# Patient Record
Sex: MALE | Race: WHITE | NOT HISPANIC OR LATINO | Employment: FULL TIME | URBAN - METROPOLITAN AREA
[De-identification: names, ages, dates, MRNs, and addresses within clinical notes are randomized per-mention and may not be internally consistent; named-entity substitution may affect disease eponyms.]

---

## 2017-02-07 ENCOUNTER — ALLSCRIPTS OFFICE VISIT (OUTPATIENT)
Dept: OTHER | Facility: OTHER | Age: 47
End: 2017-02-07

## 2017-06-07 DIAGNOSIS — R74.01 NONSPECIFIC ELEVATION OF LEVELS OF TRANSAMINASE AND LACTIC ACID DEHYDROGENASE (LDH): ICD-10-CM

## 2017-06-07 DIAGNOSIS — R74.02 NONSPECIFIC ELEVATION OF LEVELS OF TRANSAMINASE AND LACTIC ACID DEHYDROGENASE (LDH): ICD-10-CM

## 2017-06-07 DIAGNOSIS — I10 ESSENTIAL (PRIMARY) HYPERTENSION: ICD-10-CM

## 2017-06-07 DIAGNOSIS — Z00.00 ENCOUNTER FOR GENERAL ADULT MEDICAL EXAMINATION WITHOUT ABNORMAL FINDINGS: ICD-10-CM

## 2017-06-10 ENCOUNTER — APPOINTMENT (OUTPATIENT)
Dept: LAB | Facility: MEDICAL CENTER | Age: 47
End: 2017-06-10
Payer: COMMERCIAL

## 2017-06-10 DIAGNOSIS — Z00.00 ENCOUNTER FOR GENERAL ADULT MEDICAL EXAMINATION WITHOUT ABNORMAL FINDINGS: ICD-10-CM

## 2017-06-10 DIAGNOSIS — I10 ESSENTIAL (PRIMARY) HYPERTENSION: ICD-10-CM

## 2017-06-10 LAB
ALBUMIN SERPL BCP-MCNC: 4.2 G/DL (ref 3.5–5)
ALP SERPL-CCNC: 43 U/L (ref 46–116)
ALT SERPL W P-5'-P-CCNC: 89 U/L (ref 12–78)
ANION GAP SERPL CALCULATED.3IONS-SCNC: 7 MMOL/L (ref 4–13)
AST SERPL W P-5'-P-CCNC: 42 U/L (ref 5–45)
BILIRUB SERPL-MCNC: 1.4 MG/DL (ref 0.2–1)
BUN SERPL-MCNC: 13 MG/DL (ref 5–25)
CALCIUM SERPL-MCNC: 9.5 MG/DL (ref 8.3–10.1)
CHLORIDE SERPL-SCNC: 104 MMOL/L (ref 100–108)
CHOLEST SERPL-MCNC: 182 MG/DL (ref 50–200)
CO2 SERPL-SCNC: 29 MMOL/L (ref 21–32)
CREAT SERPL-MCNC: 1.02 MG/DL (ref 0.6–1.3)
EST. AVERAGE GLUCOSE BLD GHB EST-MCNC: 108 MG/DL
GFR SERPL CREATININE-BSD FRML MDRD: >60 ML/MIN/1.73SQ M
GLUCOSE P FAST SERPL-MCNC: 109 MG/DL (ref 65–99)
HBA1C MFR BLD: 5.4 % (ref 4.2–6.3)
HDLC SERPL-MCNC: 47 MG/DL (ref 40–60)
LDLC SERPL CALC-MCNC: 109 MG/DL (ref 0–100)
POTASSIUM SERPL-SCNC: 4.4 MMOL/L (ref 3.5–5.3)
PROT SERPL-MCNC: 7.6 G/DL (ref 6.4–8.2)
SODIUM SERPL-SCNC: 140 MMOL/L (ref 136–145)
TRIGL SERPL-MCNC: 128 MG/DL

## 2017-06-10 PROCEDURE — 83036 HEMOGLOBIN GLYCOSYLATED A1C: CPT

## 2017-06-10 PROCEDURE — 36415 COLL VENOUS BLD VENIPUNCTURE: CPT

## 2017-06-10 PROCEDURE — 80053 COMPREHEN METABOLIC PANEL: CPT

## 2017-06-10 PROCEDURE — 80061 LIPID PANEL: CPT

## 2017-06-11 ENCOUNTER — GENERIC CONVERSION - ENCOUNTER (OUTPATIENT)
Dept: OTHER | Facility: OTHER | Age: 47
End: 2017-06-11

## 2017-08-07 ENCOUNTER — APPOINTMENT (OUTPATIENT)
Dept: LAB | Facility: MEDICAL CENTER | Age: 47
End: 2017-08-07
Payer: COMMERCIAL

## 2017-08-07 DIAGNOSIS — R74.02 NONSPECIFIC ELEVATION OF LEVELS OF TRANSAMINASE OR LACTIC ACID DEHYDROGENASE (LDH): Primary | ICD-10-CM

## 2017-08-07 DIAGNOSIS — R74.01 NONSPECIFIC ELEVATION OF LEVELS OF TRANSAMINASE OR LACTIC ACID DEHYDROGENASE (LDH): Primary | ICD-10-CM

## 2017-08-07 LAB
ALBUMIN SERPL BCP-MCNC: 4 G/DL (ref 3.5–5)
ALP SERPL-CCNC: 46 U/L (ref 46–116)
ALT SERPL W P-5'-P-CCNC: 56 U/L (ref 12–78)
ANION GAP SERPL CALCULATED.3IONS-SCNC: 7 MMOL/L (ref 4–13)
AST SERPL W P-5'-P-CCNC: 24 U/L (ref 5–45)
BILIRUB SERPL-MCNC: 1.09 MG/DL (ref 0.2–1)
BUN SERPL-MCNC: 21 MG/DL (ref 5–25)
CALCIUM SERPL-MCNC: 9.4 MG/DL (ref 8.3–10.1)
CHLORIDE SERPL-SCNC: 106 MMOL/L (ref 100–108)
CO2 SERPL-SCNC: 26 MMOL/L (ref 21–32)
CREAT SERPL-MCNC: 0.94 MG/DL (ref 0.6–1.3)
GFR SERPL CREATININE-BSD FRML MDRD: 96 ML/MIN/1.73SQ M
GLUCOSE P FAST SERPL-MCNC: 109 MG/DL (ref 65–99)
POTASSIUM SERPL-SCNC: 4.6 MMOL/L (ref 3.5–5.3)
PROT SERPL-MCNC: 7.6 G/DL (ref 6.4–8.2)
SODIUM SERPL-SCNC: 139 MMOL/L (ref 136–145)

## 2017-08-07 PROCEDURE — 36415 COLL VENOUS BLD VENIPUNCTURE: CPT

## 2017-08-07 PROCEDURE — 80053 COMPREHEN METABOLIC PANEL: CPT

## 2017-08-08 ENCOUNTER — ALLSCRIPTS OFFICE VISIT (OUTPATIENT)
Dept: OTHER | Facility: OTHER | Age: 47
End: 2017-08-08

## 2017-08-08 DIAGNOSIS — K14.8 OTHER SPECIFIED CONDITIONS OF THE TONGUE: ICD-10-CM

## 2017-08-08 DIAGNOSIS — R73.01 IMPAIRED FASTING GLUCOSE: ICD-10-CM

## 2017-08-19 ENCOUNTER — APPOINTMENT (OUTPATIENT)
Dept: LAB | Facility: MEDICAL CENTER | Age: 47
End: 2017-08-19
Payer: COMMERCIAL

## 2017-08-19 DIAGNOSIS — K14.8 OTHER SPECIFIED CONDITIONS OF THE TONGUE: ICD-10-CM

## 2017-08-19 DIAGNOSIS — R73.01 IMPAIRED FASTING GLUCOSE: ICD-10-CM

## 2017-08-19 LAB
EST. AVERAGE GLUCOSE BLD GHB EST-MCNC: 111 MG/DL
HBA1C MFR BLD: 5.5 % (ref 4.2–6.3)
TSH SERPL DL<=0.05 MIU/L-ACNC: 0.96 UIU/ML (ref 0.36–3.74)

## 2017-08-19 PROCEDURE — 36415 COLL VENOUS BLD VENIPUNCTURE: CPT

## 2017-08-19 PROCEDURE — 84443 ASSAY THYROID STIM HORMONE: CPT

## 2017-08-19 PROCEDURE — 83036 HEMOGLOBIN GLYCOSYLATED A1C: CPT

## 2017-08-20 ENCOUNTER — GENERIC CONVERSION - ENCOUNTER (OUTPATIENT)
Dept: OTHER | Facility: OTHER | Age: 47
End: 2017-08-20

## 2017-09-05 ENCOUNTER — GENERIC CONVERSION - ENCOUNTER (OUTPATIENT)
Dept: OTHER | Facility: OTHER | Age: 47
End: 2017-09-05

## 2018-01-11 NOTE — RESULT NOTES
Message   notify the pt normal tsh, normal HgA1c f/u as scheduled        Verified Results  (1) TSH WITH FT4 REFLEX [id number] 07:22AM [facility]    Order Number: [id number]     Test Name Result Flag Reference   TSH 0 963 uIU/mL  0 358-3 740   Patients undergoing fluorescein dye angiography may retain small amounts of fluorescein in the body for 48-72 hours post procedure  Samples containing fluorescein can produce falsely depressed TSH values  If the patient had this procedure,a specimen should be resubmitted post fluorescein clearance  (1) HEMOGLOBIN A1C [id number] 07:22AM [facility]    Order Number: [id number]     Test Name Result Flag Reference   HEMOGLOBIN A1C 5 5 %  4 2-6 3   EST  AVG   GLUCOSE 111 mg/dl         Signatures   Electronically signed by : Kamila Benítez DO; Aug 20 2017 11:02AM EST                       (Author)

## 2018-01-11 NOTE — RESULT NOTES
Message   Notify the patient there is elevation of the liver enzymes please have the patient discontinue alcohol and discontinue Tylenol and recheck the liver enzymes in 2 weeks also there is elevation of blood sugar in the prediabetic range please reduce carbohydrates and sweets follow up as scheduled        Verified Results  (1) COMPREHENSIVE METABOLIC PANEL [id number] 03:64ZB Cleopatra Dodd   TW Order Number: [id number]     Test Name Result Flag Reference   SODIUM 140 mmol/L  136-145   POTASSIUM 4 4 mmol/L  3 5-5 3   CHLORIDE 104 mmol/L  100-108   CARBON DIOXIDE 29 mmol/L  21-32   ANION GAP (CALC) 7 mmol/L  4-13   BLOOD UREA NITROGEN 13 mg/dL  5-25   CREATININE 1 02 mg/dL  0 60-1 30   Standardized to IDMS reference method   CALCIUM 9 5 mg/dL  8 3-10 1   BILI, TOTAL 1 40 mg/dL H 0 20-1 00   ALK PHOSPHATAS 43 U/L L    ALT (SGPT) 89 U/L H 12-78   AST(SGOT) 42 U/L  5-45   ALBUMIN 4 2 g/dL  3 5-5 0   TOTAL PROTEIN 7 6 g/dL  6 4-8 2   eGFR Non-African American      >60 0 ml/min/1 73sq m   [facility] Disease Education Program recommendations are as follows:  GFR calculation is accurate only with a steady state creatinine  Chronic Kidney disease less than 60 ml/min/1 73 sq  meters  Kidney failure less than 15 ml/min/1 73 sq  meters     GLUCOSE FASTING 109 mg/dL H 65-99       Signatures   Electronically signed by : Kj Juarez DO; Jun 11 2017  8:22AM EST                       (Author)

## 2018-01-12 NOTE — RESULT NOTES
Message   notify the patient normal lipid panel follow up as scheduled        Verified Results  (1) COMPREHENSIVE METABOLIC PANEL [id number] 91:05YH [facility] Order Number: [id number]     Test Name Result Flag Reference   SODIUM 140 mmol/L  136-145   POTASSIUM 4 4 mmol/L  3 5-5 3   CHLORIDE 104 mmol/L  100-108   CARBON DIOXIDE 29 mmol/L  21-32   ANION GAP (CALC) 7 mmol/L  4-13   BLOOD UREA NITROGEN 13 mg/dL  5-25   CREATININE 1 02 mg/dL  0 60-1 30   Standardized to IDMS reference method   CALCIUM 9 5 mg/dL  8 3-10 1   BILI, TOTAL 1 40 mg/dL H 0 20-1 00   ALK PHOSPHATAS 43 U/L L    ALT (SGPT) 89 U/L H 12-78   AST(SGOT) 42 U/L  5-45   ALBUMIN 4 2 g/dL  3 5-5 0   TOTAL PROTEIN 7 6 g/dL  6 4-8 2   eGFR Non-African American      >60 0 ml/min/1 73sq [facility] Disease Education Program recommendations are as follows:  GFR calculation is accurate only with a steady state creatinine  Chronic Kidney disease less than 60 ml/min/1 73 sq  meters  Kidney failure less than 15 ml/min/1 73 sq  meters  GLUCOSE FASTING 109 mg/dL H 65-99     (1) HEMOGLOBIN A1C 10Jun2017 08:42AM [facility] Order Number: [id number]     Test Name Result Flag Reference   HEMOGLOBIN A1C 5 4 %  4 2-6 3   EST  AVG  GLUCOSE 108 mg/dl       (1) LIPID PANEL, FASTING 10Jun2017 08:42AM [facility] Order Number: [id number]     Test Name Result Flag Reference   CHOLESTEROL 182 mg/dL     HDL,DIRECT 47 mg/dL  40-60   Specimen collection should occur prior to Metamizole administration due to the potential for falsely depressed results  LDL CHOLESTEROL CALCULATED 109 mg/dL H 0-100   - Patient Instructions:  This is a fasting blood test  Water,black tea or black  coffee only after 9:00pm the night before test   Drink 2 glasses of water the morning of test       Triglyceride:         Normal              <150 mg/dl       Borderline High    150-199 mg/dl       High 200-499 mg/dl       Very High          >499 mg/dl  Cholesterol:         Desirable        <200 mg/dl      Borderline High  200-239 mg/dl      High             >239 mg/dl  HDL Cholesterol:        High    >59 mg/dL      Low     <41 mg/dL  LDL CALCULATED:    This screening LDL is a calculated result  It does not have the accuracy of the Direct Measured LDL in the monitoring of patients with hyperlipidemia and/or statin therapy  Direct Measure LDL (GDA373) must be ordered separately in these patients  TRIGLYCERIDES 128 mg/dL  <=150   Specimen collection should occur prior to N-Acetylcysteine or Metamizole administration due to the potential for falsely depressed results         Signatures   Electronically signed by : Geoff Diamond DO; Jun 11 2017  8:23AM EST                       (Author)

## 2018-01-14 VITALS
HEIGHT: 69 IN | SYSTOLIC BLOOD PRESSURE: 118 MMHG | DIASTOLIC BLOOD PRESSURE: 88 MMHG | OXYGEN SATURATION: 98 % | WEIGHT: 202.01 LBS | HEART RATE: 85 BPM | BODY MASS INDEX: 29.92 KG/M2 | RESPIRATION RATE: 16 BRPM

## 2018-01-16 NOTE — RESULT NOTES
Message   Normal hemoglobin A1c follow up as scheduled        Verified Results  (1) COMPREHENSIVE METABOLIC PANEL [id number] 71:34ZO [facility] Order Number: [id number]     Test Name Result Flag Reference   SODIUM 140 mmol/L  136-145   POTASSIUM 4 4 mmol/L  3 5-5 3   CHLORIDE 104 mmol/L  100-108   CARBON DIOXIDE 29 mmol/L  21-32   ANION GAP (CALC) 7 mmol/L  4-13   BLOOD UREA NITROGEN 13 mg/dL  5-25   CREATININE 1 02 mg/dL  0 60-1 30   Standardized to IDMS reference method   CALCIUM 9 5 mg/dL  8 3-10 1   BILI, TOTAL 1 40 mg/dL H 0 20-1 00   ALK PHOSPHATAS 43 U/L L    ALT (SGPT) 89 U/L H 12-78   AST(SGOT) 42 U/L  5-45   ALBUMIN 4 2 g/dL  3 5-5 0   TOTAL PROTEIN 7 6 g/dL  6 4-8 2   eGFR Non-African American      >60 0 ml/min/1 73sq [facility] Disease Education Program recommendations are as follows:  GFR calculation is accurate only with a steady state creatinine  Chronic Kidney disease less than 60 ml/min/1 73 sq  meters  Kidney failure less than 15 ml/min/1 73 sq  meters  GLUCOSE FASTING 109 mg/dL H 65-99     (1) HEMOGLOBIN A1C 10Jun2017 08:42AM [facility] Order Number: [id number]     Test Name Result Flag Reference   HEMOGLOBIN A1C 5 4 %  4 2-6 3   EST  AVG   GLUCOSE 108 mg/dl         Signatures   Electronically signed by : Magnus Seip, DO; Jun 11 2017  8:23AM EST                       (Author)

## 2018-01-17 NOTE — PROGRESS NOTES
Assessment    1  Encounter for preventive health examination (V70 0) (Z00 00)   2  Screening for eye condition (V80 2) (Z13 5)    Assessment and plan #1 annual wellness examination completed per the patient overall the patient is clinically stable and doing well I'll be ordering the patient's comprehensive metabolic panel, hemoglobin A1c, lipid panel, I recommended that he see his eye doctor for routine examination, see dentist every 6 months for cleaning, wear a sunscreen, I would like him to discontinue alcohol he declines counseling, I did advise the patient lose weight following a healthy imbalance diet and routine exercise including walking RTO in 6 months call with any problems  Plan  Health Maintenance    · (1) COMPREHENSIVE METABOLIC PANEL; Status:Active; Requested [id number];    · (1) HEMOGLOBIN A1C; Status:Active; Requested [id number];    · Begin a limited exercise program ; Status:Complete;   Done: 30OSX7477 09:41AM   · Limit your use of alcohol to 2 drinks or cans of beer a day ; Status:Complete;   Done:  33GNA4180 09:41AM   · Some eating tips that can help you lose weight ; Status:Complete;   Done: 40DHD8595  09:41AM  Health Maintenance, Hypertension    · (1) LIPID PANEL, FASTING; Status:Active; Requested [id number];   Screening for eye condition    · Fawn Mendez MD, [facility]  (Ophthalmology) Physician Referral  Consult Only: the  expectation is that the referring provider will communicate back to the patient on  treatment options  Evaluation and Treatment: the expectation is that the referred to  provider will communicate back to the patient on treatment options  Status: Hold For  - Scheduling  Requested for: 23DUO5092  Care Summary provided  : Yes    Discussion/Summary  Impression: health maintenance visit  Prostate cancer screening: PSA is not indicated  Testicular cancer screening: monthly self testicular exam was advised   Colorectal cancer screening: colorectal cancer screening is not indicated  Screening lab work includes glucose and lipid profile  The risks and benefits of immunizations were discussed and immunizations are up to date  He was advised to be evaluated by an ophthalmologist and a dentist  Advice and education were given regarding nutrition, aerobic exercise, weight loss, alcohol use and sunscreen use  Patient discussion: discussed with the patient  History of Present Illness  HM, Adult Male: The patient is being seen for a health maintenance evaluation  Social History: Household members include spouse and 2 son(s)  He is   Work status: working full time and occupation:   The patient is a former cigarette smoker and quit smoking   He 10 pack yrs  He reports frequent alcohol use, drinking 5-6 drinks per day and 3 yrs  He has never used illicit drugs  He marijuana as kid no iv  General Health: The patient's health since the last visit is described as good  He does not have regular dental visits  The patient brushes 2 time(s) a day, flosses occasionally and reports his last dental visit was 2 years ago  He denies vision problems  Vision care includes an eye examination within the last year  He denies hearing loss  Hearing is normal    Lifestyle:  He consumes a diverse and healthy diet  Dietary details include 2 servings of fruit per day, 2 servings of vegetables per day, 1 servings of meat per day, 1 servings of whole grains per day, 80 ounces of water per day, 2 servings of dairy foods per day and 2 cups of coffee per day  He has weight concerns  He does not exercise regularly  He does not use tobacco  The patient is a former cigarette smoker  Tobacco Use Duration: 1 cigarette pack(s) per day, 365 pack year(s) of cigarette use and 10 year(s) of cigarette use  He consumes alcohol  He reports frequent alcohol use and drinking 5-6 drinks per day  He typically drinks beer, but no wine consumption and no hard liquor consumption  He denies drug use   He has never used illicit drugs  Reproductive health:  the patient is sexually active  He denies erectile dysfunction  Screening: Prostate cancer screening includes no previous evaluation  Testicular cancer screening includes no self testicular examinations  Colorectal cancer screening includes no previous screening  Safety elements used: seat belt, safe driving habits, smoke detector, carbon monoxide detector, gun trigger locks and CPR training for household members, but no CPR training for the patient  Risk findings: no passive smoke exposure, no anxiety symptoms, no depression symptoms, no travel to developing areas and no tuberculosis exposure  Active Problems    1  Abdominal pain (789 00) (R10 9)   2  Alpha-1-antitrypsin deficiency carrier (V83 89) (E88 01)   3  Anxiety (300 00) (F41 9)   4  Cervical radiculopathy (723 4) (M54 12)   5  Fatty liver (571 8) (K76 0)   6  Flu vaccine need (V04 81) (Z23)   7  Generalized anxiety disorder (300 02) (F41 1)   8  Hypertension (401 9) (I10)   9  Neck pain (723 1) (M54 2)   10  Need for influenza vaccination (V04 81) (Z23)   11  Nontoxic single thyroid nodule (241 0) (E04 1)   12  Paresthesia (782 0) (R20 2)   13  Skin rash (782 1) (R21)   14  Viral gastroenteritis (008 8) (A08 4)   15  Visit for pre-operative examination (V72 84) (Z01 818)    Past Medical History    · History of essential hypertension (V12 59) (Z86 79)    Surgical History    · History of Cataract Surgery   · History of Knee Surgery    Family History  Family History    · Family history of Colon Cancer (V16 0)   · Family history of Diabetes Mellitus (V18 0)   · Family history of Family Health Status Of Father - Alive   · Family history of Family Health Status Of Mother - Alive    Social History    · Alcohol Use (History)   · Being A Social Drinker   · Denied: History of Drug Use   · Former smoker (V90 45) (X63 374)   · Quit 20 years ago   Smoked for 7 years about a half a pack of cigarettes a day    Current Meds   1  AmLODIPine Besylate 5 MG Oral Tablet; take 1 tablet by mouth one time daily; Therapy: 45IKS8993 to (Magi Fajardo)  Requested for: 79LDD5637; Last   Rx:91Pqt9387 Ordered   2  Prednicarbate 0 1 % External Cream; APPLY SPARINGLY TO AFFECTED AREA(S)   TWICE DAILY; Therapy: 21YWZ9979 to Recorded   3  Vitamin C 500 MG Oral Capsule; TAKE 1 CAPSULE DAILY; Therapy: (Recorded:07Feb2017) to Recorded    Allergies    1  No Known Drug Allergies    2  No Known Environmental Allergies   3  No Known Food Allergies    Vitals   Recorded: 32LMF9803 08:43AM   Heart Rate 68   Respiration 16   Systolic 353   Diastolic 88   BP CUFF SIZE Large   Height 5 ft 9 5 in   Weight 207 lb 0 2 oz   BMI Calculated 30 13   BSA Calculated 2 11     Results/Data  PHQ-2 Adult Depression Screening 59Tbu3328 08:49AM User, [facility]     Test Name Result Flag Reference   PHQ-2 Adult Depression Score 0     Over the last two weeks, how often have you been bothered by any of the following problems?   Little interest or pleasure in doing things: Not at all - 0  Feeling down, depressed, or hopeless: Not at all - 0   PHQ-2 Adult Depression Screening Negative         Signatures   Electronically signed by : Ridge Moore DO; Feb 7 2017  9:43AM EST                       (Author)

## 2018-01-22 VITALS
DIASTOLIC BLOOD PRESSURE: 88 MMHG | HEART RATE: 68 BPM | SYSTOLIC BLOOD PRESSURE: 132 MMHG | HEIGHT: 70 IN | RESPIRATION RATE: 16 BRPM | WEIGHT: 207.01 LBS | BODY MASS INDEX: 29.64 KG/M2

## 2018-06-19 DIAGNOSIS — I10 ESSENTIAL HYPERTENSION: Primary | ICD-10-CM

## 2018-06-19 RX ORDER — AMLODIPINE BESYLATE 5 MG/1
TABLET ORAL
Qty: 30 TABLET | Refills: 5 | Status: SHIPPED | OUTPATIENT
Start: 2018-06-19 | End: 2019-01-09 | Stop reason: SDUPTHER

## 2018-06-26 RX ORDER — MULTIVIT-MIN/IRON/FOLIC ACID/K 18-600-40
1 CAPSULE ORAL DAILY
COMMUNITY

## 2018-06-26 RX ORDER — EMOLLIENT 1 MG/G
CREAM TOPICAL 2 TIMES DAILY
Status: ON HOLD | COMMUNITY
Start: 2014-03-18 | End: 2020-12-16 | Stop reason: ALTCHOICE

## 2018-07-02 ENCOUNTER — OFFICE VISIT (OUTPATIENT)
Dept: INTERNAL MEDICINE CLINIC | Facility: CLINIC | Age: 48
End: 2018-07-02
Payer: COMMERCIAL

## 2018-07-02 VITALS
HEIGHT: 69 IN | RESPIRATION RATE: 16 BRPM | WEIGHT: 204.4 LBS | HEART RATE: 92 BPM | OXYGEN SATURATION: 93 % | DIASTOLIC BLOOD PRESSURE: 80 MMHG | BODY MASS INDEX: 30.27 KG/M2 | SYSTOLIC BLOOD PRESSURE: 142 MMHG

## 2018-07-02 DIAGNOSIS — Z13.6 SCREENING FOR CARDIOVASCULAR CONDITION: ICD-10-CM

## 2018-07-02 DIAGNOSIS — R06.83 SNORING: ICD-10-CM

## 2018-07-02 DIAGNOSIS — F41.9 ANXIETY: ICD-10-CM

## 2018-07-02 DIAGNOSIS — R10.9 ABDOMINAL PAIN, UNSPECIFIED ABDOMINAL LOCATION: ICD-10-CM

## 2018-07-02 DIAGNOSIS — R07.81 RIB PAIN ON RIGHT SIDE: Primary | ICD-10-CM

## 2018-07-02 PROCEDURE — 3008F BODY MASS INDEX DOCD: CPT | Performed by: INTERNAL MEDICINE

## 2018-07-02 PROCEDURE — 99214 OFFICE O/P EST MOD 30 MIN: CPT | Performed by: INTERNAL MEDICINE

## 2018-07-02 RX ORDER — ESCITALOPRAM OXALATE 5 MG/1
5 TABLET ORAL DAILY
Qty: 30 TABLET | Refills: 2 | Status: SHIPPED | OUTPATIENT
Start: 2018-07-02 | End: 2018-11-21

## 2018-07-02 RX ORDER — DIPHENOXYLATE HYDROCHLORIDE AND ATROPINE SULFATE 2.5; .025 MG/1; MG/1
1 TABLET ORAL DAILY
COMMUNITY

## 2018-07-02 NOTE — PROGRESS NOTES
Assessment/Plan:           Problem List Items Addressed This Visit     Rib pain on right side - Primary     Suspect intercostal muscle strain will check x-rays of the ribs and also check ultrasound the right upper quadrant of the abdomen  Will also get a GI consultation rule out gallbladder disease rule out duodenitis rule out musculoskeletal          Relevant Orders    XR ribs 2 vw right    US right upper quadrant    Snoring     Snoring, daytime hypersomnolence I am concerned the patient may have obstructive sleep apnea I will check a home sleep study         Relevant Orders    Home Study    Abdominal pain     Intermittent right upper quadrant abdominal pain after eating rule out gallbladder disease will check ultrasound of the abdomen of the patient see GI          Relevant Orders    US right upper quadrant    CBC (Includes Diff/Plt) (Refl)    Comprehensive metabolic panel    Chronic Hepatitis Panel    Anxiety     No suicidal ideation will start patient on Lexapro 5 mg 1 tablet per day I reviewed the risks benefits and side effects of the medication the patient understands and has given verbal consent to start medication  If a side effects whatsoever he is to notify me immediately  I have recommended counseling, the patient declined  No suicidal ideation I did explain to the patient he may try melatonin as directed p r n  Relevant Medications    escitalopram (LEXAPRO) 5 mg tablet    Other Relevant Orders    TSH, 3rd generation    Screening for cardiovascular condition     I have counselled the pt to follow a healthy and balanced diet ,and recommend routine exercise  Will check fasting comprehensive metabolic panel, lipid         Relevant Orders    Lipid Panel with Direct LDL reflex          Return to office 1  month  call if any problems  Subjective:      Patient ID: Leydi Alcaraz is a 50 y o  male      HPI 22-year-old male coming in with chief complaint of right ribcage pain, snoring, right upper quadrant abdominal pain, anxiety; the patient reports me that he is very busy at 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 , a lot going on and the pt keeps getting ache in the right lateral chest wall, napping after eating , snoring , wakes tired,  Coffee 2 day, no witnesses apnea; running a [employer], dad passed in feb (dm, parkinson's ) he reports tense, fidgety, when work load less will be fine , frustrated , no overwhelmed, not hopeless,  Good support not depressed no si no hi etoh 4-5/d self medicating, no suicidal ideation, no homicidal ideation  He reports me anxiety, insomnia, he does report me he is able to stop alcohol on his own he does not want counseling he does not want rehab, he has been able to stop in the past without any withdrawal symptoms  The following portions of the patient's history were reviewed and updated as appropriate: allergies, current medications, past family history, past medical history, past social history, past surgical history and problem list     Review of Systems   Constitutional: Negative for activity change, appetite change and unexpected weight change  HENT: Negative for dental problem and postnasal drip  Eyes: Negative for visual disturbance  Respiratory: Negative for cough and shortness of breath  Cardiovascular: Negative for chest pain  Gastrointestinal: Positive for abdominal pain  Negative for diarrhea, nausea and vomiting  Musculoskeletal:        Ribcage pain   Neurological: Negative for dizziness, light-headedness and headaches  Hematological: Negative for adenopathy  Psychiatric/Behavioral: Positive for sleep disturbance  Negative for suicidal ideas  The patient is nervous/anxious  Objective:      /80 (BP Location: Left arm, Patient Position: Sitting, Cuff Size: Standard)   Pulse 92   Resp 16   Ht 5' 9" (1 753 m)   Wt 92 7 kg (204 lb 6 4 oz)   SpO2 93%   BMI 30 18 kg/m²                       No Follow-up on file        No Known Allergies    Past Medical History:   Diagnosis Date    Essential hypertension      Past Surgical History:   Procedure Laterality Date    CATARACT EXTRACTION Left     KNEE SURGERY Left      Current Outpatient Prescriptions on File Prior to Visit   Medication Sig Dispense Refill    amLODIPine (NORVASC) 5 mg tablet TAKE 1 TABLET BY MOUTH ONE TIME DAILY 30 tablet 5    Ascorbic Acid (VITAMIN C) 500 MG CAPS Take 1 capsule by mouth daily      Prednicarbate 0 1 % CREA Apply topically 2 (two) times a day       No current facility-administered medications on file prior to visit  Family History   Problem Relation Age of Onset    No Known Problems Mother     No Known Problems Father     Colon cancer Family     Diabetes Family         Mellitus     Social History     Social History    Marital status: /Civil Union     Spouse name: N/A    Number of children: N/A    Years of education: N/A     Occupational History    Not on file  Social History Main Topics    Smoking status: Former Smoker     Types: Cigarettes    Smokeless tobacco: Never Used      Comment: Per [employer]: quit 20 yrs ago   Smoked for 7 yrs about a half a pack of cigarettes a day    Alcohol use Yes      Comment: Social    Drug use: No    Sexual activity: Not on file     Other Topics Concern    Not on file     Social History Narrative    No narrative on file     Vitals:    07/02/18 1258   BP: 142/80   BP Location: Left arm   Patient Position: Sitting   Cuff Size: Standard   Pulse: 92   Resp: 16   SpO2: 93%   Weight: 92 7 kg (204 lb 6 4 oz)   Height: 5' 9" (1 753 m)     Results for orders placed or performed in visit on 08/19/17   TSH, 3rd generation with T4 reflex   Result Value Ref Range    TSH 3RD GENERATON 0 963 0 358 - 3 740 uIU/mL   Hemoglobin A1c   Result Value Ref Range    Hemoglobin A1C 5 5 4 2 - 6 3 %     mg/dl     Weight (last 2 days)     Date/Time   Weight    07/02/18 1258  92 7 (204 4)            Body mass index is 30 18 kg/m²  BP      Temp      Pulse     Resp      SpO2        Vitals:    07/02/18 1258   Weight: 92 7 kg (204 lb 6 4 oz)     Vitals:    07/02/18 1258   Weight: 92 7 kg (204 lb 6 4 oz)     Mild reproducible tenderness of the intercostal muscle between the see 11/12 ribs right lateral aspect   Physical Exam   Constitutional: He appears well-developed and well-nourished  No distress  HENT:   Head: Normocephalic and atraumatic  Right Ear: External ear normal    Left Ear: External ear normal    Mouth/Throat: Oropharynx is clear and moist    Eyes: Conjunctivae are normal  Pupils are equal, round, and reactive to light  Right eye exhibits no discharge  Left eye exhibits no discharge  No scleral icterus  Neck: Neck supple  Cardiovascular: Normal rate, regular rhythm and normal heart sounds  Exam reveals no gallop and no friction rub  No murmur heard  Pulmonary/Chest: No respiratory distress  He has no wheezes  He has no rales  Abdominal: Soft  Bowel sounds are normal  He exhibits no distension and no mass  There is no tenderness  There is no rebound and no guarding  Musculoskeletal: He exhibits no edema or deformity  Lymphadenopathy:     He has no cervical adenopathy  Neurological: He is alert  Skin: He is not diaphoretic  Psychiatric: His mood appears anxious  He does not exhibit a depressed mood  He expresses no homicidal and no suicidal ideation

## 2018-07-03 NOTE — ASSESSMENT & PLAN NOTE
Snoring, daytime hypersomnolence I am concerned the patient may have obstructive sleep apnea I will check a home sleep study

## 2018-07-03 NOTE — ASSESSMENT & PLAN NOTE
Intermittent right upper quadrant abdominal pain after eating rule out gallbladder disease will check ultrasound of the abdomen of the patient see GI

## 2018-07-03 NOTE — ASSESSMENT & PLAN NOTE
Suspect intercostal muscle strain will check x-rays of the ribs and also check ultrasound the right upper quadrant of the abdomen    Will also get a GI consultation rule out gallbladder disease rule out duodenitis rule out musculoskeletal

## 2018-07-03 NOTE — ASSESSMENT & PLAN NOTE
I have counselled the pt to follow a healthy and balanced diet ,and recommend routine exercise  Will check fasting comprehensive metabolic panel, lipid

## 2018-07-03 NOTE — ASSESSMENT & PLAN NOTE
No suicidal ideation will start patient on Lexapro 5 mg 1 tablet per day I reviewed the risks benefits and side effects of the medication the patient understands and has given verbal consent to start medication  If a side effects whatsoever he is to notify me immediately  I have recommended counseling, the patient declined  No suicidal ideation I did explain to the patient he may try melatonin as directed p chu n

## 2018-07-14 ENCOUNTER — APPOINTMENT (OUTPATIENT)
Dept: LAB | Facility: MEDICAL CENTER | Age: 48
End: 2018-07-14
Payer: COMMERCIAL

## 2018-07-14 DIAGNOSIS — Z13.6 SCREENING FOR CARDIOVASCULAR CONDITION: ICD-10-CM

## 2018-07-14 DIAGNOSIS — R10.9 ABDOMINAL PAIN, UNSPECIFIED ABDOMINAL LOCATION: ICD-10-CM

## 2018-07-14 DIAGNOSIS — F41.9 ANXIETY: ICD-10-CM

## 2018-07-14 LAB
ALBUMIN SERPL BCP-MCNC: 4 G/DL (ref 3.5–5)
ALP SERPL-CCNC: 44 U/L (ref 46–116)
ALT SERPL W P-5'-P-CCNC: 87 U/L (ref 12–78)
ANION GAP SERPL CALCULATED.3IONS-SCNC: 8 MMOL/L (ref 4–13)
AST SERPL W P-5'-P-CCNC: 37 U/L (ref 5–45)
BASOPHILS # BLD AUTO: 0.04 THOUSANDS/ΜL (ref 0–0.1)
BASOPHILS NFR BLD AUTO: 1 % (ref 0–1)
BILIRUB SERPL-MCNC: 0.82 MG/DL (ref 0.2–1)
BUN SERPL-MCNC: 20 MG/DL (ref 5–25)
CALCIUM SERPL-MCNC: 8.9 MG/DL (ref 8.3–10.1)
CHLORIDE SERPL-SCNC: 104 MMOL/L (ref 100–108)
CHOLEST SERPL-MCNC: 158 MG/DL (ref 50–200)
CO2 SERPL-SCNC: 25 MMOL/L (ref 21–32)
CREAT SERPL-MCNC: 1.05 MG/DL (ref 0.6–1.3)
EOSINOPHIL # BLD AUTO: 0.09 THOUSAND/ΜL (ref 0–0.61)
EOSINOPHIL NFR BLD AUTO: 1 % (ref 0–6)
ERYTHROCYTE [DISTWIDTH] IN BLOOD BY AUTOMATED COUNT: 12.4 % (ref 11.6–15.1)
GFR SERPL CREATININE-BSD FRML MDRD: 84 ML/MIN/1.73SQ M
GLUCOSE P FAST SERPL-MCNC: 106 MG/DL (ref 65–99)
HCT VFR BLD AUTO: 48 % (ref 36.5–49.3)
HDLC SERPL-MCNC: 41 MG/DL (ref 40–60)
HGB BLD-MCNC: 16 G/DL (ref 12–17)
IMM GRANULOCYTES # BLD AUTO: 0.05 THOUSAND/UL (ref 0–0.2)
IMM GRANULOCYTES NFR BLD AUTO: 1 % (ref 0–2)
LDLC SERPL CALC-MCNC: 88 MG/DL (ref 0–100)
LYMPHOCYTES # BLD AUTO: 2.09 THOUSANDS/ΜL (ref 0.6–4.47)
LYMPHOCYTES NFR BLD AUTO: 30 % (ref 14–44)
MCH RBC QN AUTO: 31.6 PG (ref 26.8–34.3)
MCHC RBC AUTO-ENTMCNC: 33.3 G/DL (ref 31.4–37.4)
MCV RBC AUTO: 95 FL (ref 82–98)
MONOCYTES # BLD AUTO: 0.77 THOUSAND/ΜL (ref 0.17–1.22)
MONOCYTES NFR BLD AUTO: 11 % (ref 4–12)
NEUTROPHILS # BLD AUTO: 3.99 THOUSANDS/ΜL (ref 1.85–7.62)
NEUTS SEG NFR BLD AUTO: 56 % (ref 43–75)
NRBC BLD AUTO-RTO: 0 /100 WBCS
PLATELET # BLD AUTO: 223 THOUSANDS/UL (ref 149–390)
PMV BLD AUTO: 10.4 FL (ref 8.9–12.7)
POTASSIUM SERPL-SCNC: 4.4 MMOL/L (ref 3.5–5.3)
PROT SERPL-MCNC: 7.7 G/DL (ref 6.4–8.2)
RBC # BLD AUTO: 5.07 MILLION/UL (ref 3.88–5.62)
SODIUM SERPL-SCNC: 137 MMOL/L (ref 136–145)
TRIGL SERPL-MCNC: 146 MG/DL
TSH SERPL DL<=0.05 MIU/L-ACNC: 1.02 UIU/ML (ref 0.36–3.74)
WBC # BLD AUTO: 7.03 THOUSAND/UL (ref 4.31–10.16)

## 2018-07-14 PROCEDURE — 86705 HEP B CORE ANTIBODY IGM: CPT

## 2018-07-14 PROCEDURE — 87340 HEPATITIS B SURFACE AG IA: CPT

## 2018-07-14 PROCEDURE — 85025 COMPLETE CBC W/AUTO DIFF WBC: CPT

## 2018-07-14 PROCEDURE — 86704 HEP B CORE ANTIBODY TOTAL: CPT

## 2018-07-14 PROCEDURE — 36415 COLL VENOUS BLD VENIPUNCTURE: CPT

## 2018-07-14 PROCEDURE — 80061 LIPID PANEL: CPT

## 2018-07-14 PROCEDURE — 84443 ASSAY THYROID STIM HORMONE: CPT

## 2018-07-14 PROCEDURE — 80053 COMPREHEN METABOLIC PANEL: CPT

## 2018-07-14 PROCEDURE — 86803 HEPATITIS C AB TEST: CPT

## 2018-07-15 LAB
HBV CORE AB SER QL: NORMAL
HBV CORE IGM SER QL: NORMAL
HBV SURFACE AG SER QL: NORMAL
HCV AB SER QL: NORMAL

## 2018-07-16 DIAGNOSIS — R74.8 ELEVATED LIVER ENZYMES: Primary | ICD-10-CM

## 2018-07-17 ENCOUNTER — APPOINTMENT (OUTPATIENT)
Dept: RADIOLOGY | Facility: MEDICAL CENTER | Age: 48
End: 2018-07-17
Payer: COMMERCIAL

## 2018-07-17 ENCOUNTER — HOSPITAL ENCOUNTER (OUTPATIENT)
Dept: RADIOLOGY | Facility: MEDICAL CENTER | Age: 48
Discharge: HOME | End: 2018-07-17
Payer: COMMERCIAL

## 2018-07-17 DIAGNOSIS — R10.9 ABDOMINAL PAIN, UNSPECIFIED ABDOMINAL LOCATION: ICD-10-CM

## 2018-07-17 DIAGNOSIS — R07.81 RIB PAIN ON RIGHT SIDE: ICD-10-CM

## 2018-07-17 PROCEDURE — 76705 ECHO EXAM OF ABDOMEN: CPT

## 2018-07-17 PROCEDURE — 71101 X-RAY EXAM UNILAT RIBS/CHEST: CPT

## 2018-07-20 ENCOUNTER — TELEPHONE (OUTPATIENT)
Dept: INTERNAL MEDICINE CLINIC | Facility: CLINIC | Age: 48
End: 2018-07-20

## 2018-07-31 ENCOUNTER — TELEPHONE (OUTPATIENT)
Dept: SLEEP CENTER | Facility: CLINIC | Age: 48
End: 2018-07-31

## 2018-07-31 NOTE — TELEPHONE ENCOUNTER
----- Message from Cosme Durbin MD sent at 7/27/2018  4:53 PM EDT -----  Regarding: RE: sleep study approval  Approved  ----- Message -----  From: Heather Cowart: 7/27/2018   3:59 PM  To:  Cosme Durbin MD  Subject: RE: sleep study approval                         Pt does snore  ----- Message -----  From: Cosme Durbin MD  Sent: 7/23/2018   1:56 PM  To: Ryland Easley  Subject: RE: sleep study approval                             ----- Message -----  From: Heather Cowart: 7/23/2018  11:22 AM  To: Cosme Durbin MD  Subject: RE: sleep study approval                         Yes it does say patient snores in 7/2/18 note  ----- Message -----  From: Cosme Durbin MD  Sent: 7/20/2018   5:37 PM  To: Ryland Easley  Subject: RE: sleep study approval                         Snoring?  ----- Message -----  From: Heather Cowart: 7/19/2018   9:13 AM  To: Dunia Gibbs 92, #  Subject: sleep study approval                             Please review for approval or denial   Dr Marcial Talamantes 7/2/18 note

## 2018-08-08 DIAGNOSIS — I10 ESSENTIAL (PRIMARY) HYPERTENSION: ICD-10-CM

## 2018-08-09 ENCOUNTER — TRANSCRIBE ORDERS (OUTPATIENT)
Dept: ADMINISTRATIVE | Facility: HOSPITAL | Age: 48
End: 2018-08-09

## 2018-08-09 ENCOUNTER — APPOINTMENT (OUTPATIENT)
Dept: LAB | Facility: MEDICAL CENTER | Age: 48
End: 2018-08-09
Payer: COMMERCIAL

## 2018-08-09 ENCOUNTER — OFFICE VISIT (OUTPATIENT)
Dept: INTERNAL MEDICINE CLINIC | Facility: CLINIC | Age: 48
End: 2018-08-09
Payer: COMMERCIAL

## 2018-08-09 VITALS
BODY MASS INDEX: 30.51 KG/M2 | OXYGEN SATURATION: 95 % | SYSTOLIC BLOOD PRESSURE: 122 MMHG | HEIGHT: 69 IN | HEART RATE: 91 BPM | RESPIRATION RATE: 16 BRPM | WEIGHT: 206 LBS | DIASTOLIC BLOOD PRESSURE: 88 MMHG

## 2018-08-09 DIAGNOSIS — R10.11 RIGHT UPPER QUADRANT ABDOMINAL PAIN: Primary | ICD-10-CM

## 2018-08-09 DIAGNOSIS — Z00.8 HEALTH EXAMINATION IN POPULATION SURVEY: Primary | ICD-10-CM

## 2018-08-09 DIAGNOSIS — R74.8 ELEVATED LIVER ENZYMES: ICD-10-CM

## 2018-08-09 DIAGNOSIS — I10 ESSENTIAL HYPERTENSION: ICD-10-CM

## 2018-08-09 DIAGNOSIS — Z00.8 HEALTH EXAMINATION IN POPULATION SURVEY: ICD-10-CM

## 2018-08-09 DIAGNOSIS — Z00.00 WELLNESS EXAMINATION: ICD-10-CM

## 2018-08-09 DIAGNOSIS — R73.03 PREDIABETES: ICD-10-CM

## 2018-08-09 DIAGNOSIS — F41.9 ANXIETY: ICD-10-CM

## 2018-08-09 LAB
CHOLEST SERPL-MCNC: 172 MG/DL (ref 50–200)
EST. AVERAGE GLUCOSE BLD GHB EST-MCNC: 105 MG/DL
HBA1C MFR BLD: 5.3 % (ref 4.2–6.3)
HDLC SERPL-MCNC: 44 MG/DL (ref 40–60)
LDLC SERPL CALC-MCNC: 102 MG/DL (ref 0–100)
NONHDLC SERPL-MCNC: 128 MG/DL
TRIGL SERPL-MCNC: 128 MG/DL

## 2018-08-09 PROCEDURE — 80061 LIPID PANEL: CPT

## 2018-08-09 PROCEDURE — 3074F SYST BP LT 130 MM HG: CPT | Performed by: INTERNAL MEDICINE

## 2018-08-09 PROCEDURE — 99214 OFFICE O/P EST MOD 30 MIN: CPT | Performed by: INTERNAL MEDICINE

## 2018-08-09 PROCEDURE — 99396 PREV VISIT EST AGE 40-64: CPT | Performed by: INTERNAL MEDICINE

## 2018-08-09 PROCEDURE — 3079F DIAST BP 80-89 MM HG: CPT | Performed by: INTERNAL MEDICINE

## 2018-08-09 PROCEDURE — 83036 HEMOGLOBIN GLYCOSYLATED A1C: CPT

## 2018-08-09 NOTE — PROGRESS NOTES
Assessment/Plan:           Problem List Items Addressed This Visit        Other    Abdominal pain    Relevant Orders    Ambulatory referral to Gastroenterology    Comprehensive metabolic panel    Wellness examination     Assessment and plan 1  Health maintenance annual wellness examination overall the patient is clinically stable and doing well, we encouraged the patient to follow a healthy and balanced diet  We recommend that the patient exercise routinely approximately 30 minutes 5 times per week   We have reviewed the patient's vaccines and have made recommendations for updates if necessary flu vaccine in the fall  We will be ordering screening laboratories which are age appropriate  Return to the office in 3 months call if any problems  Other Visit Diagnoses     Prediabetes    -  Primary    Relevant Orders    Comprehensive metabolic panel    Hemoglobin A1C            Subjective:      Patient ID: Tyree Madison is a 50 y o  male  HPI regular job stress ,pt is working  aqble to relax on vacation   No si has plan hold lexapo stopped etho for 1/5 weeks,  had stroke increase etoh (1/2 the etoh)_  AWV Clinical     ISAR:       Once in a Lifetime Medicare Screening:       Medicare Screening Tests and Risk Assessment:   AAA Risk Assessment    Osteoporosis Risk Assessment    HIV Risk Assessment        Drug and Alcohol Use:   Tobacco use    Cigarettes:  former smoker    Quit date:  8/9/1998   Smokeless:  never used smokeless tobacco    Tobacco use duration    Packs per day:  1    Tobacco Cessation Readiness     Next steps:  patient verbalized quitting   Alcohol use    Alcohol use:  frequent use    Amount of alcohol consumed:  3-4/day   Concern about alcohol use:  Yes Tolerance to alcohol:  No   Attempts to cut down:  Yes Guilt about use:  No   Patient concern:   Yes Annoyed by criticism:  Yes   Morning drinking:  No Family concern:  No   Alcohol Treatment Readiness   Readiness to quit: ready Next steps:  patient verbalized quitting   Illicit Drug Use    Drug use:  never        Diet & Exercise:   Diet   What is your diet?:  Low Cholesterol, Regular   How many servings a day of the following:   Fruits and Vegetables:  1-2 Meat:  1-2   Whole Grains:  1 Simple Carbs:  1   Dairy:  2 Soda:  0   Coffee:  1, 2 Tea:  0   Exercise    Do you currently exercise?:  currently not exercising       Cognitive Impairment Screening:   Cognitive Impairment Screening        Functional Ability/Level of Safety:   Hearing    Hearing Impairment Assessment    Current Activities    Help needed with the folllowing:    ADL    Fall Risk   Have you fallen in the last 12 months?:  No    Injury History       Home Safety:   Are there hazards in your environment?:  No   Do you have working smoke alarms and fire extinguisher?:  Yes Do all household members know how to use them?:  Yes   Home Safety Risk Factors       Advanced Directives:   Advanced Directives    Living Will:  Yes Durable POA for healthcare:  Yes   Patient's End of Life Decisions        Urinary Incontinence:       Glaucoma: The following portions of the patient's history were reviewed and updated as appropriate: allergies, current medications, past family history, past medical history, past social history, past surgical history and problem list     Review of Systems      Objective:                  No Follow-up on file        No Known Allergies    Past Medical History:   Diagnosis Date    Essential hypertension      Past Surgical History:   Procedure Laterality Date    CATARACT EXTRACTION Left     KNEE SURGERY Left      Current Outpatient Prescriptions on File Prior to Visit   Medication Sig Dispense Refill    amLODIPine (NORVASC) 5 mg tablet TAKE 1 TABLET BY MOUTH ONE TIME DAILY 30 tablet 5    Ascorbic Acid (VITAMIN C) 500 MG CAPS Take 1 capsule by mouth daily      escitalopram (LEXAPRO) 5 mg tablet Take 1 tablet (5 mg total) by mouth daily 30 tablet 2    multivitamin (THERAGRAN) TABS Take 1 tablet by mouth daily      Prednicarbate 0 1 % CREA Apply topically 2 (two) times a day       No current facility-administered medications on file prior to visit  Family History   Problem Relation Age of Onset    No Known Problems Mother     No Known Problems Father     Colon cancer Family     Diabetes Family         Mellitus     Social History     Social History    Marital status: /Civil Union     Spouse name: N/A    Number of children: N/A    Years of education: N/A     Occupational History    Not on file  Social History Main Topics    Smoking status: Former Smoker     Types: Cigarettes    Smokeless tobacco: Never Used      Comment: Per [employer]: quit 20 yrs ago  Smoked for 7 yrs about a half a pack of cigarettes a day    Alcohol use Yes      Comment: Social    Drug use: No    Sexual activity: Not on file     Other Topics Concern    Not on file     Social History Narrative    No narrative on file     Vitals:    08/09/18 0756   BP: 122/88   BP Location: Left arm   Patient Position: Sitting   Cuff Size: Standard   Pulse: 91   Resp: 16   SpO2: 95%   Weight: 93 4 kg (206 lb)   Height: 5' 9" (1 753 m)     Results for orders placed or performed in visit on 08/09/18   Hemoglobin A1C   Result Value Ref Range    Hemoglobin A1C 5 3 4 2 - 6 3 %     mg/dl   Lipid panel   Result Value Ref Range    Cholesterol 172 50 - 200 mg/dL    Triglycerides 128 <=150 mg/dL    HDL, Direct 44 40 - 60 mg/dL    LDL Calculated 102 (H) 0 - 100 mg/dL    Non-HDL-Chol (CHOL-HDL) 128 mg/dl     Weight (last 2 days)     Date/Time   Weight    08/09/18 0756  93 4 (206)            Body mass index is 30 42 kg/m²    BP      Temp      Pulse     Resp      SpO2        Vitals:    08/09/18 0756   Weight: 93 4 kg (206 lb)     Vitals:    08/09/18 0756   Weight: 93 4 kg (206 lb)         /88 (BP Location: Left arm, Patient Position: Sitting, Cuff Size: Standard)   Pulse 91   Resp 16   Ht 5' 9" (1 753 m)   Wt 93 4 kg (206 lb)   SpO2 95%   BMI 30 42 kg/m²          Physical Exam   Constitutional: He appears well-developed and well-nourished  No distress  HENT:   Head: Normocephalic and atraumatic  Right Ear: External ear normal    Left Ear: External ear normal    Mouth/Throat: Oropharynx is clear and moist    Eyes: Conjunctivae are normal  Pupils are equal, round, and reactive to light  Right eye exhibits no discharge  Left eye exhibits no discharge  No scleral icterus  Neck: Neck supple  Cardiovascular: Normal rate, regular rhythm and normal heart sounds  Exam reveals no gallop and no friction rub  No murmur heard  Pulmonary/Chest: No respiratory distress  He has no wheezes  He has no rales  Abdominal: Soft  Bowel sounds are normal  He exhibits no distension and no mass  There is no tenderness  There is no rebound and no guarding  Musculoskeletal: He exhibits no edema or deformity  Lymphadenopathy:     He has no cervical adenopathy  Neurological: He is alert  Skin: He is not diaphoretic  Psychiatric: He has a normal mood and affect

## 2018-08-09 NOTE — ASSESSMENT & PLAN NOTE
Right upper quadrant abdominal discomfort suspect secondary to liver inflammation from alcohol use his symptoms improved when he discontinue alcohol x-ray of the ribs negative, ultrasound does show fatty liver at this point time I would like him to see GI for consideration of colonoscopy and EGD he may need endoscopic ultrasound to assess the pancreas further

## 2018-08-09 NOTE — ASSESSMENT & PLAN NOTE
Related to losing his  who was recently had a stroke the patient is now working 2 jobs he reports me going back to alcohol to cope with the stress  I have counseled patient at length about discontinuing alcohol no suicidal ideation  The patient does not want to go for counseling, he declines rehab he does not want to go on a medicine for anxiety he did not start the Lexapro he feels he can stop on his own and he will try again I have counseled patient about quitting alcohol

## 2018-08-09 NOTE — ASSESSMENT & PLAN NOTE
Assessment and plan 1  Health maintenance annual wellness examination overall the patient is clinically stable and doing well, we encouraged the patient to follow a healthy and balanced diet  We recommend that the patient exercise routinely approximately 30 minutes 5 times per week   We have reviewed the patient's vaccines and have made recommendations for updates if necessary flu vaccine in the fall  We will be ordering screening laboratories which are age appropriate  Return to the office in 3 months call if any problems

## 2018-08-09 NOTE — PROGRESS NOTES
Assessment/Plan:    Wellness examination  Assessment and plan 1  Health maintenance annual wellness examination overall the patient is clinically stable and doing well, we encouraged the patient to follow a healthy and balanced diet  We recommend that the patient exercise routinely approximately 30 minutes 5 times per week   We have reviewed the patient's vaccines and have made recommendations for updates if necessary flu vaccine in the fall  We will be ordering screening laboratories which are age appropriate  Return to the office in 3 months call if any problems  Abdominal pain  Right upper quadrant abdominal discomfort suspect secondary to liver inflammation from alcohol use his symptoms improved when he discontinue alcohol x-ray of the ribs negative, ultrasound does show fatty liver at this point time I would like him to see GI for consideration of colonoscopy and EGD he may need endoscopic ultrasound to assess the pancreas further  Anxiety  Related to losing his  who was recently had a stroke the patient is now working 2 jobs he reports me going back to alcohol to cope with the stress  I have counseled patient at length about discontinuing alcohol no suicidal ideation  The patient does not want to go for counseling, he declines rehab he does not want to go on a medicine for anxiety he did not start the Lexapro he feels he can stop on his own and he will try again I have counseled patient about quitting alcohol  Essential hypertension  Hypertension - controlled, I have counseled patient following healthy balance diet, I would like the patient reduce sodium, exercise routinely, I would like the patient continued the med current medical regiment and we will continue to monitor  Prediabetes  Pre diabetes -I have counseled the patient to follow a healthy balanced diet, I have counseled patient reduce carbohydrates and sweets in the diet, I would like the patient exercise routinely  I will be checking hemoglobin A1c and comprehensive metabolic panel  Have counseled patient about the prevention of diabetes, and the risk of progression to type 2 diabetes  Problem List Items Addressed This Visit        Cardiovascular and Mediastinum    Essential hypertension     Hypertension - controlled, I have counseled patient following healthy balance diet, I would like the patient reduce sodium, exercise routinely, I would like the patient continued the med current medical regiment and we will continue to monitor  Other    Abdominal pain - Primary     Right upper quadrant abdominal discomfort suspect secondary to liver inflammation from alcohol use his symptoms improved when he discontinue alcohol x-ray of the ribs negative, ultrasound does show fatty liver at this point time I would like him to see GI for consideration of colonoscopy and EGD he may need endoscopic ultrasound to assess the pancreas further  Relevant Orders    Ambulatory referral to Gastroenterology    Comprehensive metabolic panel    Anxiety     Related to losing his  who was recently had a stroke the patient is now working 2 jobs he reports me going back to alcohol to cope with the stress  I have counseled patient at length about discontinuing alcohol no suicidal ideation  The patient does not want to go for counseling, he declines rehab he does not want to go on a medicine for anxiety he did not start the Lexapro he feels he can stop on his own and he will try again I have counseled patient about quitting alcohol  Wellness examination     Assessment and plan 1  Health maintenance annual wellness examination overall the patient is clinically stable and doing well, we encouraged the patient to follow a healthy and balanced diet  We recommend that the patient exercise routinely approximately 30 minutes 5 times per week     We have reviewed the patient's vaccines and have made recommendations for updates if necessary flu vaccine in the fall  We will be ordering screening laboratories which are age appropriate  Return to the office in 3 months call if any problems  Prediabetes     Pre diabetes -I have counseled the patient to follow a healthy balanced diet, I have counseled patient reduce carbohydrates and sweets in the diet, I would like the patient exercise routinely  I will be checking hemoglobin A1c and comprehensive metabolic panel  Have counseled patient about the prevention of diabetes, and the risk of progression to type 2 diabetes  Relevant Orders    Comprehensive metabolic panel    Hemoglobin A1C          Return to office 3  months  call if any problems  Subjective:      Patient ID: Jasper Aase is a 50 y o  male  HPI 46-year old male coming in for a follow up visit regarding prediabetes, right upper quadrant abdominal pain, anxiety, essential hypertension; The patient reports me compliant taking medications without untoward side effects the  The patient is here to review his medical condition, update me on the medical condition and the patient reports me no hospitalizations and no ER visits  The patient reports me he had stopped alcohol for several weeks but there was a stressful event a visits partner had a stroke in the patient had to take up this work leading to stress he dealt with it by drinking alcohol reports me he is consuming 50% less to where he had been  The patient did not start Lexapro no suicidal ideation  He does not want to go for counseling he does not want rehabilitation he states he can stop on his own and will try again  He does have ongoing right upper quadrant abdominal discomfort he reports me this pain improved when he stopped alcohol  Today he is here to review his ultrasound, laboratories      The following portions of the patient's history were reviewed and updated as appropriate: allergies, current medications, past family history, past medical history, past social history, past surgical history and problem list     Review of Systems   Constitutional: Negative for activity change, appetite change and unexpected weight change  Eyes: Negative for visual disturbance  Respiratory: Negative for cough and shortness of breath  Cardiovascular: Negative for chest pain  Gastrointestinal: Positive for abdominal pain  Negative for diarrhea, nausea and vomiting  Neurological: Negative for dizziness, light-headedness and headaches  Hematological: Negative for adenopathy  Psychiatric/Behavioral: Negative for suicidal ideas  The patient is nervous/anxious  Objective:      /88 (BP Location: Left arm, Patient Position: Sitting, Cuff Size: Standard)   Pulse 91   Resp 16   Ht 5' 9" (1 753 m)   Wt 93 4 kg (206 lb)   SpO2 95%   BMI 30 42 kg/m²          Physical Exam   Constitutional: He appears well-developed and well-nourished  No distress  HENT:   Head: Normocephalic and atraumatic  Right Ear: External ear normal    Left Ear: External ear normal    Mouth/Throat: Oropharynx is clear and moist    Eyes: Conjunctivae are normal  Pupils are equal, round, and reactive to light  Right eye exhibits no discharge  Left eye exhibits no discharge  No scleral icterus  Neck: Neck supple  Cardiovascular: Normal rate, regular rhythm and normal heart sounds  Exam reveals no gallop and no friction rub  No murmur heard  Pulmonary/Chest: No respiratory distress  He has no wheezes  He has no rales  Abdominal: Soft  Bowel sounds are normal  He exhibits no distension and no mass  There is no tenderness  There is no rebound and no guarding  Musculoskeletal: He exhibits no edema or deformity  Lymphadenopathy:     He has no cervical adenopathy  Neurological: He is alert  Skin: He is not diaphoretic  Psychiatric: He has a normal mood and affect       mild right upper quadrant abdominal tenderness

## 2018-08-27 ENCOUNTER — HOSPITAL ENCOUNTER (OUTPATIENT)
Dept: SLEEP CENTER | Facility: CLINIC | Age: 48
Discharge: HOME | End: 2018-08-27
Payer: COMMERCIAL

## 2018-08-27 DIAGNOSIS — R06.83 SNORING: ICD-10-CM

## 2018-08-27 PROCEDURE — G0399 HOME SLEEP TEST/TYPE 3 PORTA: HCPCS

## 2018-08-30 DIAGNOSIS — G47.33 OSA (OBSTRUCTIVE SLEEP APNEA): Primary | ICD-10-CM

## 2018-08-31 ENCOUNTER — TELEPHONE (OUTPATIENT)
Dept: SLEEP CENTER | Facility: CLINIC | Age: 48
End: 2018-08-31

## 2018-08-31 NOTE — TELEPHONE ENCOUNTER
Left message for wife to have patient return call to [address] for test results  Patient needs autoset and sleep consult with Dr Radha Burrows

## 2018-09-04 DIAGNOSIS — G47.33 OSA (OBSTRUCTIVE SLEEP APNEA): Primary | ICD-10-CM

## 2018-11-21 ENCOUNTER — OFFICE VISIT (OUTPATIENT)
Dept: OBGYN CLINIC | Facility: MEDICAL CENTER | Age: 48
End: 2018-11-21
Payer: COMMERCIAL

## 2018-11-21 ENCOUNTER — APPOINTMENT (OUTPATIENT)
Dept: RADIOLOGY | Facility: MEDICAL CENTER | Age: 48
End: 2018-11-21
Payer: COMMERCIAL

## 2018-11-21 VITALS
HEIGHT: 69 IN | DIASTOLIC BLOOD PRESSURE: 88 MMHG | BODY MASS INDEX: 30.36 KG/M2 | SYSTOLIC BLOOD PRESSURE: 121 MMHG | WEIGHT: 205 LBS | HEART RATE: 103 BPM

## 2018-11-21 DIAGNOSIS — M25.562 ACUTE PAIN OF LEFT KNEE: ICD-10-CM

## 2018-11-21 DIAGNOSIS — M76.892 TENDINITIS OF LEFT QUADRICEPS TENDON: Primary | ICD-10-CM

## 2018-11-21 PROCEDURE — 99203 OFFICE O/P NEW LOW 30 MIN: CPT | Performed by: ORTHOPAEDIC SURGERY

## 2018-11-21 PROCEDURE — 73562 X-RAY EXAM OF KNEE 3: CPT

## 2018-11-21 NOTE — PROGRESS NOTES
50 y o male presents for new evaluation of left knee pain  Patient states that the pain has been intermittent over the past 6 months  He does not recall a an acute event which preceded the knee pain  The pain is vague in nature, is global about the knee but does not cause much dysfunction  He also notes that he has tightness in the left calf  Of note he had a sledding accident when he was 15years old which ruptured a ligament requiring operative repair  He also has many acres of land which requires tending she is able to do with very little difficulty  He denies any effusion about the knee, numbness or tingling to the left lower extremity  He has no other complaints at this time  Review of Systems  Review of systems negative unless otherwise specified in HPI    Past Medical History  Past Medical History:   Diagnosis Date    Essential hypertension        Past Surgical History  Past Surgical History:   Procedure Laterality Date    CATARACT EXTRACTION Left     KNEE SURGERY Left        Current Medications  Current Outpatient Prescriptions on File Prior to Visit   Medication Sig Dispense Refill    amLODIPine (NORVASC) 5 mg tablet TAKE 1 TABLET BY MOUTH ONE TIME DAILY 30 tablet 5    Ascorbic Acid (VITAMIN C) 500 MG CAPS Take 1 capsule by mouth daily      multivitamin (THERAGRAN) TABS Take 1 tablet by mouth daily      Prednicarbate 0 1 % CREA Apply topically 2 (two) times a day      [DISCONTINUED] escitalopram (LEXAPRO) 5 mg tablet Take 1 tablet (5 mg total) by mouth daily 30 tablet 2     No current facility-administered medications on file prior to visit          Recent Labs [facility])    0  Lab Value Date/Time   HCT 48 0 07/14/2018 0756   HCT 46 5 08/12/2014 0751   HGB 16 0 07/14/2018 0756   HGB 16 1 08/12/2014 0751   WBC 7 03 07/14/2018 0756   WBC 7 20 08/12/2014 0751   GLUCOSE 114 04/05/2014 0854   HGBA1C 5 3 08/09/2018 0703   HGBA1C 5 1 08/22/2015 0817         Physical exam  · General: Awake, Alert, Oriented  · Eyes: Pupils equal, round and reactive to light  · Heart: regular rate and rhythm  · Lungs: No audible wheezing  left Knee exam  · Skin over the knee intact, well-healed surgical incision  · No effusion appreciated  · Tenderness to palpation over the quadriceps tendon especially with deep knee flexion  · Stable to varus valgus stress  · Negative Gilberto, negative Lachman, negative posterior drawer  · No joint line tenderness appreciated  · Motor and sensation intact  · Limb warm and well perfused    Procedure  No procedures performed during this visit    Imaging  Radiographs of the left knee were obtained today and personally reviewed by myself Dr Romario Alcaraz    They reveal the left knee with no acute fracture dislocation with a well-preserved joint space and minimal osteoarthritic changes    60-year-old male with left knee pain secondary to quadriceps tendinitis  · Weightbearing as tolerated left lower extremity  · Patient should initiate stretching regimen as instructed  · Patient may use oral anti-inflammatories as needed for pain  · He will be contacted in 4 weeks to evaluate efficacy of stretching and anti-inflammatory program  · Patient understands and agrees with plan above

## 2018-12-13 ENCOUNTER — IMMUNIZATION (OUTPATIENT)
Dept: INTERNAL MEDICINE CLINIC | Facility: CLINIC | Age: 48
End: 2018-12-13
Payer: COMMERCIAL

## 2018-12-13 DIAGNOSIS — Z23 NEED FOR INFLUENZA VACCINATION: Primary | ICD-10-CM

## 2018-12-13 PROCEDURE — 90682 RIV4 VACC RECOMBINANT DNA IM: CPT

## 2018-12-13 PROCEDURE — 90471 IMMUNIZATION ADMIN: CPT

## 2019-01-09 DIAGNOSIS — I10 ESSENTIAL HYPERTENSION: ICD-10-CM

## 2019-01-09 RX ORDER — AMLODIPINE BESYLATE 5 MG/1
TABLET ORAL
Qty: 30 TABLET | Refills: 5 | Status: SHIPPED | OUTPATIENT
Start: 2019-01-09 | End: 2019-07-30 | Stop reason: SDUPTHER

## 2019-07-30 DIAGNOSIS — I10 ESSENTIAL HYPERTENSION: ICD-10-CM

## 2019-07-30 RX ORDER — AMLODIPINE BESYLATE 5 MG/1
TABLET ORAL
Qty: 30 TABLET | Refills: 5 | Status: SHIPPED | OUTPATIENT
Start: 2019-07-30 | End: 2020-03-06

## 2020-03-06 DIAGNOSIS — I10 ESSENTIAL HYPERTENSION: ICD-10-CM

## 2020-03-06 RX ORDER — AMLODIPINE BESYLATE 5 MG/1
TABLET ORAL
Qty: 30 TABLET | Refills: 0 | Status: SHIPPED | OUTPATIENT
Start: 2020-03-06 | End: 2020-04-10

## 2020-04-09 DIAGNOSIS — I10 ESSENTIAL HYPERTENSION: ICD-10-CM

## 2020-04-10 RX ORDER — AMLODIPINE BESYLATE 5 MG/1
TABLET ORAL
Qty: 30 TABLET | Refills: 0 | Status: SHIPPED | OUTPATIENT
Start: 2020-04-10 | End: 2020-05-19

## 2020-05-19 DIAGNOSIS — I10 ESSENTIAL HYPERTENSION: ICD-10-CM

## 2020-05-19 RX ORDER — AMLODIPINE BESYLATE 5 MG/1
TABLET ORAL
Qty: 30 TABLET | Refills: 0 | Status: SHIPPED | OUTPATIENT
Start: 2020-05-19 | End: 2020-05-26

## 2020-05-26 DIAGNOSIS — I10 ESSENTIAL HYPERTENSION: ICD-10-CM

## 2020-05-26 RX ORDER — AMLODIPINE BESYLATE 5 MG/1
TABLET ORAL
Qty: 30 TABLET | Refills: 0 | Status: SHIPPED | OUTPATIENT
Start: 2020-05-26 | End: 2020-05-27 | Stop reason: SDUPTHER

## 2020-05-27 DIAGNOSIS — I10 ESSENTIAL HYPERTENSION: ICD-10-CM

## 2020-05-28 RX ORDER — AMLODIPINE BESYLATE 5 MG/1
5 TABLET ORAL DAILY
Qty: 30 TABLET | Refills: 5 | Status: SHIPPED | OUTPATIENT
Start: 2020-05-28 | End: 2021-04-23

## 2020-07-27 ENCOUNTER — OFFICE VISIT (OUTPATIENT)
Dept: INTERNAL MEDICINE CLINIC | Facility: CLINIC | Age: 50
End: 2020-07-27
Payer: COMMERCIAL

## 2020-07-27 VITALS
SYSTOLIC BLOOD PRESSURE: 134 MMHG | DIASTOLIC BLOOD PRESSURE: 80 MMHG | OXYGEN SATURATION: 95 % | HEART RATE: 88 BPM | HEIGHT: 69 IN | WEIGHT: 210.6 LBS | BODY MASS INDEX: 31.19 KG/M2 | RESPIRATION RATE: 16 BRPM | TEMPERATURE: 98.3 F

## 2020-07-27 DIAGNOSIS — B34.9 VIRAL ILLNESS: ICD-10-CM

## 2020-07-27 DIAGNOSIS — Z12.5 SCREENING PSA (PROSTATE SPECIFIC ANTIGEN): ICD-10-CM

## 2020-07-27 DIAGNOSIS — I10 ESSENTIAL HYPERTENSION: ICD-10-CM

## 2020-07-27 DIAGNOSIS — Z00.00 WELLNESS EXAMINATION: ICD-10-CM

## 2020-07-27 DIAGNOSIS — K76.0 FATTY LIVER: ICD-10-CM

## 2020-07-27 DIAGNOSIS — E88.01 ALPHA-1-ANTITRYPSIN DEFICIENCY (HCC): Primary | ICD-10-CM

## 2020-07-27 DIAGNOSIS — R73.03 PREDIABETES: ICD-10-CM

## 2020-07-27 PROCEDURE — 3008F BODY MASS INDEX DOCD: CPT | Performed by: INTERNAL MEDICINE

## 2020-07-27 PROCEDURE — 3075F SYST BP GE 130 - 139MM HG: CPT | Performed by: INTERNAL MEDICINE

## 2020-07-27 PROCEDURE — 3079F DIAST BP 80-89 MM HG: CPT | Performed by: INTERNAL MEDICINE

## 2020-07-27 PROCEDURE — 99396 PREV VISIT EST AGE 40-64: CPT | Performed by: INTERNAL MEDICINE

## 2020-07-27 NOTE — PROGRESS NOTES
Assessment/Plan:    Wellness examination  Assessment and plan 1  Health maintenance annual wellness examination overall the patient is clinically stable and doing well, we encouraged the patient to follow a healthy and balanced diet  We recommend that the patient exercise routinely approximately 30 minutes 5 times per week   We have reviewed the patient's vaccines and have made recommendations for updates if necessary  annual flu vaccine consider new shingles vaccine he will be added to the shingles vaccine wait list, will order a screening colonoscopy and screening PSA      We will be ordering screening laboratories which are age appropriate  Return to the office in 6 months     call if any problems  Essential hypertension  Hypertension - controlled, I have counseled patient following healthy balance diet, I would like the patient reduce sodium, exercise routinely, I would like the patient continued the med current medical regiment and we will continue to monitor  Prediabetes  Pre diabetes -I have counseled the patient to follow a healthy balanced diet, I have counseled patient reduce carbohydrates and sweets in the diet, I would like the patient exercise routinely  I will be checking hemoglobin A1c and comprehensive metabolic panel  Have counseled patient about the prevention of diabetes, and the risk of progression to type 2 diabetes  Viral illness  He had a viral illness several months ago he is concerned about COVID-19 symptoms resolved check COVID-19 IgG antibody           Problem List Items Addressed This Visit        Digestive    Alpha-1-antitrypsin deficiency [facility]) - Primary    Relevant Orders    Ambulatory referral to Gastroenterology    US liver    Fatty liver    Relevant Orders    US liver       Cardiovascular and Mediastinum    Essential hypertension     Hypertension - controlled, I have counseled patient following healthy balance diet, I would like the patient reduce sodium, exercise routinely, I would like the patient continued the med current medical regiment and we will continue to monitor  Relevant Orders    Comprehensive metabolic panel    Lipid Panel with Direct LDL reflex       Other    Wellness examination     Assessment and plan 1  Health maintenance annual wellness examination overall the patient is clinically stable and doing well, we encouraged the patient to follow a healthy and balanced diet  We recommend that the patient exercise routinely approximately 30 minutes 5 times per week   We have reviewed the patient's vaccines and have made recommendations for updates if necessary  annual flu vaccine consider new shingles vaccine he will be added to the shingles vaccine wait list, will order a screening colonoscopy and screening PSA      We will be ordering screening laboratories which are age appropriate  Return to the office in 6 months     call if any problems  Prediabetes     Pre diabetes -I have counseled the patient to follow a healthy balanced diet, I have counseled patient reduce carbohydrates and sweets in the diet, I would like the patient exercise routinely  I will be checking hemoglobin A1c and comprehensive metabolic panel  Have counseled patient about the prevention of diabetes, and the risk of progression to type 2 diabetes  Relevant Orders    Hemoglobin A1C    Viral illness     He had a viral illness several months ago he is concerned about COVID-19 symptoms resolved check COVID-19 IgG antibody  Relevant Orders    SARS CoV 2 SEROLOGY (COVID 19) AB (IGG), [place]    Screening PSA (prostate specific antigen)    Relevant Orders    PSA, Total Screen          Return to office  6 months  call if any problems  Subjective:      Patient ID: Angela Lopes is a 48 y o  male      HPI 48year old male coming in for his annual examination he reports me he drives a car safely, reports me he wears a seatbelt he has a smoke alarm and fire extinguisher sometimes he wears a sunscreen but after I counseled him he will now starting to wear a sunscreen routinely  He is trying to follow healthy diet? feb was exposed , back of the nfeck was warm , cecked temp 100 times no temp no cough , no body aches no sob ,  Diet improved, walking     The following portions of the patient's history were reviewed and updated as appropriate: allergies, current medications, past family history, past medical history, past social history, past surgical history and problem list     Review of Systems   Constitutional: Negative for activity change, appetite change and unexpected weight change  HENT: Negative for congestion and postnasal drip  Respiratory: Negative for cough and shortness of breath  Cardiovascular: Negative for chest pain  Gastrointestinal: Negative for abdominal pain, diarrhea, nausea and vomiting  Neurological: Negative for dizziness, light-headedness and headaches  Objective:    No follow-ups on file  No results found  No Known Allergies    Past Medical History:   Diagnosis Date    Essential hypertension      Past Surgical History:   Procedure Laterality Date    CATARACT EXTRACTION Left     KNEE SURGERY Left      Current Outpatient Medications on File Prior to Visit   Medication Sig Dispense Refill    amLODIPine (NORVASC) 5 mg tablet Take 1 tablet (5 mg total) by mouth daily 30 tablet 5    Ascorbic Acid (VITAMIN C) 500 MG CAPS Take 1 capsule by mouth daily      multivitamin (THERAGRAN) TABS Take 1 tablet by mouth daily      Prednicarbate 0 1 % CREA Apply topically 2 (two) times a day       No current facility-administered medications on file prior to visit        Family History   Problem Relation Age of Onset    No Known Problems Mother     No Known Problems Father     Colon cancer Family     Diabetes Family         Mellitus     Social History     Socioeconomic History    Marital status: /Civil Union     Spouse name: Not on file    Number of children: Not on file    Years of education: Not on file    Highest education level: Not on file   Occupational History    Not on file   Social Needs    Financial resource strain: Not on file    Food insecurity:     Worry: Not on file     Inability: Not on file    Transportation needs:     Medical: Not on file     Non-medical: Not on file   Tobacco Use    Smoking status: Former Smoker     Types: Cigarettes    Smokeless tobacco: Never Used    Tobacco comment: Per [employer]: quit 20 yrs ago   Smoked for 7 yrs about a half a pack of cigarettes a day   Substance and Sexual Activity    Alcohol use: Yes     Comment: Social    Drug use: No    Sexual activity: Not on file   Lifestyle    Physical activity:     Days per week: Not on file     Minutes per session: Not on file    Stress: Not on file   Relationships    Social connections:     Talks on phone: Not on file     Gets together: Not on file     Attends Druze service: Not on file     Active member of club or organization: Not on file     Attends meetings of clubs or organizations: Not on file     Relationship status: Not on file    Intimate partner violence:     Fear of current or ex partner: Not on file     Emotionally abused: Not on file     Physically abused: Not on file     Forced sexual activity: Not on file   Other Topics Concern    Not on file   Social History Narrative    Not on file     Vitals:    07/27/20 1329   BP: 134/80   Pulse: 88   Resp: 16   Temp: 98 3 °F (36 8 °C)   TempSrc: Temporal   SpO2: 95%   Weight: 95 5 kg (210 lb 9 6 oz)   Height: 5' 9" (1 753 m)     Results for orders placed or performed in visit on 08/09/18   Hemoglobin A1C   Result Value Ref Range    Hemoglobin A1C 5 3 4 2 - 6 3 %     mg/dl   Lipid panel   Result Value Ref Range    Cholesterol 172 50 - 200 mg/dL    Triglycerides 128 <=150 mg/dL    HDL, Direct 44 40 - 60 mg/dL    LDL Calculated 102 (H) 0 - 100 mg/dL    Non-HDL-Chol (CHOL-HDL) 128 mg/dl     Weight (last 2 days)     Date/Time   Weight    07/27/20 1329   95 5 (210 6)            Body mass index is 31 1 kg/m²  BP      Temp      Pulse     Resp      SpO2        Vitals:    07/27/20 1329   Weight: 95 5 kg (210 lb 9 6 oz)     Vitals:    07/27/20 1329   Weight: 95 5 kg (210 lb 9 6 oz)       /80   Pulse 88   Temp 98 3 °F (36 8 °C) (Temporal)   Resp 16   Ht 5' 9" (1 753 m)   Wt 95 5 kg (210 lb 9 6 oz)   SpO2 95%   BMI 31 10 kg/m²          Physical Exam   Constitutional: He appears well-developed and well-nourished  No distress  HENT:   Head: Normocephalic and atraumatic  Right Ear: External ear normal    Left Ear: External ear normal    Eyes: Pupils are equal, round, and reactive to light  Conjunctivae are normal  Right eye exhibits no discharge  Left eye exhibits no discharge  No scleral icterus  Neck: Neck supple  Cardiovascular: Normal rate, regular rhythm and normal heart sounds  Exam reveals no gallop and no friction rub  No murmur heard  Pulmonary/Chest: No respiratory distress  He has no wheezes  He has no rales  Abdominal: Soft  Bowel sounds are normal  He exhibits no distension and no mass  There is no tenderness  There is no rebound and no guarding  Musculoskeletal: He exhibits no edema or deformity  Lymphadenopathy:     He has no cervical adenopathy  Neurological: He is alert  Skin: He is not diaphoretic  Psychiatric: He has a normal mood and affect

## 2020-07-27 NOTE — ASSESSMENT & PLAN NOTE
He had a viral illness several months ago he is concerned about COVID-19 symptoms resolved check COVID-19 IgG antibody

## 2020-07-27 NOTE — ASSESSMENT & PLAN NOTE
Assessment and plan 1  Health maintenance annual wellness examination overall the patient is clinically stable and doing well, we encouraged the patient to follow a healthy and balanced diet  We recommend that the patient exercise routinely approximately 30 minutes 5 times per week   We have reviewed the patient's vaccines and have made recommendations for updates if necessary  annual flu vaccine consider new shingles vaccine he will be added to the shingles vaccine wait list, will order a screening colonoscopy and screening PSA      We will be ordering screening laboratories which are age appropriate  Return to the office in 6 months     call if any problems

## 2020-07-31 ENCOUNTER — APPOINTMENT (OUTPATIENT)
Dept: LAB | Facility: MEDICAL CENTER | Age: 50
End: 2020-07-31
Payer: COMMERCIAL

## 2020-07-31 DIAGNOSIS — Z12.5 SCREENING PSA (PROSTATE SPECIFIC ANTIGEN): ICD-10-CM

## 2020-07-31 DIAGNOSIS — B34.9 VIRAL ILLNESS: ICD-10-CM

## 2020-07-31 DIAGNOSIS — I10 ESSENTIAL HYPERTENSION: ICD-10-CM

## 2020-07-31 DIAGNOSIS — R73.03 PREDIABETES: ICD-10-CM

## 2020-07-31 LAB
ALBUMIN SERPL BCP-MCNC: 3.9 G/DL (ref 3.5–5)
ALP SERPL-CCNC: 50 U/L (ref 46–116)
ALT SERPL W P-5'-P-CCNC: 84 U/L (ref 12–78)
ANION GAP SERPL CALCULATED.3IONS-SCNC: 7 MMOL/L (ref 4–13)
AST SERPL W P-5'-P-CCNC: 38 U/L (ref 5–45)
BILIRUB SERPL-MCNC: 0.86 MG/DL (ref 0.2–1)
BUN SERPL-MCNC: 13 MG/DL (ref 5–25)
CALCIUM SERPL-MCNC: 9.1 MG/DL (ref 8.3–10.1)
CHLORIDE SERPL-SCNC: 104 MMOL/L (ref 100–108)
CHOLEST SERPL-MCNC: 184 MG/DL (ref 50–200)
CO2 SERPL-SCNC: 27 MMOL/L (ref 21–32)
CREAT SERPL-MCNC: 0.97 MG/DL (ref 0.6–1.3)
EST. AVERAGE GLUCOSE BLD GHB EST-MCNC: 108 MG/DL
GFR SERPL CREATININE-BSD FRML MDRD: 91 ML/MIN/1.73SQ M
GLUCOSE P FAST SERPL-MCNC: 103 MG/DL (ref 65–99)
HBA1C MFR BLD: 5.4 %
HDLC SERPL-MCNC: 42 MG/DL
LDLC SERPL CALC-MCNC: 107 MG/DL (ref 0–100)
POTASSIUM SERPL-SCNC: 4.5 MMOL/L (ref 3.5–5.3)
PROT SERPL-MCNC: 8 G/DL (ref 6.4–8.2)
PSA SERPL-MCNC: 1 NG/ML (ref 0–4)
SARS-COV-2 IGG+IGM SERPL QL IA: NORMAL
SODIUM SERPL-SCNC: 138 MMOL/L (ref 136–145)
TRIGL SERPL-MCNC: 176 MG/DL

## 2020-07-31 PROCEDURE — 86769 SARS-COV-2 COVID-19 ANTIBODY: CPT

## 2020-07-31 PROCEDURE — G0103 PSA SCREENING: HCPCS

## 2020-07-31 PROCEDURE — 80061 LIPID PANEL: CPT

## 2020-07-31 PROCEDURE — 83036 HEMOGLOBIN GLYCOSYLATED A1C: CPT

## 2020-07-31 PROCEDURE — 80053 COMPREHEN METABOLIC PANEL: CPT

## 2020-07-31 PROCEDURE — 36415 COLL VENOUS BLD VENIPUNCTURE: CPT

## 2020-08-05 ENCOUNTER — HOSPITAL ENCOUNTER (OUTPATIENT)
Dept: RADIOLOGY | Facility: MEDICAL CENTER | Age: 50
Discharge: HOME | End: 2020-08-05
Payer: COMMERCIAL

## 2020-08-05 DIAGNOSIS — K76.0 FATTY LIVER: ICD-10-CM

## 2020-08-05 DIAGNOSIS — E88.01 ALPHA-1-ANTITRYPSIN DEFICIENCY (HCC): ICD-10-CM

## 2020-08-05 PROCEDURE — 76705 ECHO EXAM OF ABDOMEN: CPT

## 2020-08-11 DIAGNOSIS — K76.0 FATTY LIVER: ICD-10-CM

## 2020-08-11 DIAGNOSIS — R16.0 ENLARGED LIVER: Primary | ICD-10-CM

## 2020-09-01 ENCOUNTER — CONSULT (OUTPATIENT)
Dept: GASTROENTEROLOGY | Facility: AMBULARY SURGERY CENTER | Age: 50
End: 2020-09-01
Payer: COMMERCIAL

## 2020-09-01 VITALS — WEIGHT: 208 LBS | TEMPERATURE: 97.8 F | BODY MASS INDEX: 30.81 KG/M2 | HEART RATE: 82 BPM | HEIGHT: 69 IN

## 2020-09-01 DIAGNOSIS — R10.11 RIGHT UPPER QUADRANT ABDOMINAL PAIN: ICD-10-CM

## 2020-09-01 DIAGNOSIS — K76.0 FATTY LIVER: ICD-10-CM

## 2020-09-01 DIAGNOSIS — Z12.11 COLON CANCER SCREENING: Primary | ICD-10-CM

## 2020-09-01 DIAGNOSIS — E88.01 ALPHA-1-ANTITRYPSIN DEFICIENCY (HCC): ICD-10-CM

## 2020-09-01 PROCEDURE — 99244 OFF/OP CNSLTJ NEW/EST MOD 40: CPT | Performed by: INTERNAL MEDICINE

## 2020-09-01 PROCEDURE — 1036F TOBACCO NON-USER: CPT | Performed by: INTERNAL MEDICINE

## 2020-09-01 RX ORDER — HYDROCORTISONE VALERATE 2 MG/G
OINTMENT TOPICAL
COMMUNITY
Start: 2020-08-17

## 2020-09-01 RX ORDER — SODIUM, POTASSIUM,MAG SULFATES 17.5-3.13G
1 SOLUTION, RECONSTITUTED, ORAL ORAL ONCE
Qty: 1 BOTTLE | Refills: 0 | Status: SHIPPED | OUTPATIENT
Start: 2020-09-01 | End: 2020-10-05 | Stop reason: ALTCHOICE

## 2020-09-01 NOTE — LETTER
September 1, 2020     Amanda Moctezuma  [address] [facility] [address]    Patient: Ar Nick   YOB: 1970   Date of Visit: 9/1/2020       Dear Dr Justice Mckenna: Thank you for referring Mary Carmen Mills to me for evaluation  Below are my notes for this consultation  If you have questions, please do not hesitate to call me  I look forward to following your patient along with you  Sincerely,        Cherylene Needy, MD        CC: No Recipients  Cherylene Needy, MD  9/1/2020  6:13 PM  Sign when Signing Visit  Consultation - [address] [facility]  Ar Nick 48 y o  male MRN: [id number]          Assessment & Plan:    Pleasant 49-year-old gentleman with chronic right upper quadrant pain which has worsened become more persistent over the past 10 years, also due for colon cancer screening  1  Right upper quadrant pain:  Suspect this may be due to capsular swelling of for his liver however this is unlikely given the chronic nature of this  -may be musculoskeletal versus peptic ulcer disease  -encouraged the patient to continue alcohol minimization  -hopefully with weight loss and cutting down on alcohol of his liver swelling improves his pain may improve  -recommend proceeding with an upper endoscopy to exclude peptic ulcer disease  -if endoscopic evaluation is unremarkable and symptoms persist we can check a CT scan if still negative then would treat him for neuropathic pain with medications such as amitriptyline    2  Colon cancer screening:  Patient is due for screening examination  -we will schedule patient for colonoscopy the same time as upper endoscopy  -discussed with him the risks of the procedures including bleeding, surgery, perforation, missed polyp detection rate    3   Elevated LFTs:  Secondary to excessive alcohol consumption  -encouraged the patient to continue to minimize alcohol _____________________________________________________________        CC:  Right upper quadrant abdominal pain and colon cancer screening    HPI:  Aniyah Alberts is a 48 y o male who was referred for evaluation of right upper quadrant bowel pain and colon cancer screening  As you know this is a pleasant 49-year-old gentleman otherwise in good health with history of hypertension who reports a longstanding history of right upper quadrant pain greater than 10 years  Reports history of alpha-1 antitrypsin deficiency  Has been seen by GI and hepatology the past for the right upper quadrant pain  He notes that in the past cc occur postprandially but over the most recent years has become more persistent  He now has pain more often than not, not associated with eating  He clearly describes a positional component, worse when sitting down or driving, better when standing upright  No change with bowel movements  No associated nausea or vomiting  No radiation  He describes the right sided dull achy pain under his right costal margin  He has had ultrasounds and CT scans in the past which have been unremarkable except for hepatomegaly which has been noted to be increasing over the years from 15 to now 19 centimeters over the last 5 years  He has also had mild elevation of LFTs  He previously was drinking 6 to 8 beers every day, he recently stopped over the last 1 month  Patient denies any prior surgical history  Denies any tobacco   Currently is down to drinking about 9 drinks per week  He works as a   Family history is negative for GI or associated malignancies  Patient has never had a colonoscopy  He denies any nausea, vomiting, heartburn, dysphagia, diarrhea, constipation, melena, rectal bleeding  ROS:  The remainder of the ROS was negative except for the pertinent positives mentioned in HPI  Allergies: Patient has no known allergies      Medications:   Current Outpatient Medications:     amLODIPine (NORVASC) 5 mg tablet, Take 1 tablet (5 mg total) by mouth daily, Disp: 30 tablet, Rfl: 5    Ascorbic Acid (VITAMIN C) 500 MG CAPS, Take 1 capsule by mouth daily, Disp: , Rfl:     hydrocortisone (WESTCORT) 0 2 % cream, , Disp: , Rfl:     hydrocortisone valerate (WEST-GA) 0 2 % ointment, As needed prefers the cream, Disp: , Rfl:     multivitamin (THERAGRAN) TABS, Take 1 tablet by mouth daily, Disp: , Rfl:     Prednicarbate 0 1 % CREA, Apply topically 2 (two) times a day, Disp: , Rfl:     Na Sulfate-K Sulfate-Mg Sulf (Suprep Bowel Prep Kit) 17 5-3 13-1 6 GM/177ML SOLN, Take 1 Bottle by mouth once for 1 dose, Disp: 1 Bottle, Rfl: 0'    Past Medical History:   Diagnosis Date    Essential hypertension        Past Surgical History:   Procedure Laterality Date    CATARACT EXTRACTION Left     KNEE SURGERY Left        Family History   Problem Relation Age of Onset    No Known Problems Mother     No Known Problems Father     Colon cancer Family     Diabetes Family         Mellitus    Colon cancer Maternal Grandmother         reports that he has quit smoking  His smoking use included cigarettes  He has never used smokeless tobacco  He reports current alcohol use  He reports that he does not use drugs            Physical Exam:     Pulse 82   Temp 97 8 °F (36 6 °C)   Ht 5' 9" (1 753 m)   Wt 94 3 kg (208 lb)   BMI 30 72 kg/m²     Gen: wn/wd, NAD, healthy-appearing male  HEENT: anicteric, MMM, no cervical LAD  CVS: RRR, no m/r/g  CHEST: CTA b/l  ABD: +BS, soft, mildly reproducible right upper quadrant pain under his right costal margin, no hepatosplenomegaly  EXT: no c/c/e  NEURO: aaox3  SKIN: NO rashes

## 2020-09-01 NOTE — PROGRESS NOTES
Consultation - [facility]) [facility]  Kenneth Doss 48 y o  male MRN: [id number]          Assessment & Plan:    Pleasant 72-year-old gentleman with chronic right upper quadrant pain which has worsened become more persistent over the past 10 years, also due for colon cancer screening  1  Right upper quadrant pain:  Suspect this may be due to capsular swelling of for his liver however this is unlikely given the chronic nature of this  -may be musculoskeletal versus peptic ulcer disease  -encouraged the patient to continue alcohol minimization  -hopefully with weight loss and cutting down on alcohol of his liver swelling improves his pain may improve  -recommend proceeding with an upper endoscopy to exclude peptic ulcer disease  -if endoscopic evaluation is unremarkable and symptoms persist we can check a CT scan if still negative then would treat him for neuropathic pain with medications such as amitriptyline    2  Colon cancer screening:  Patient is due for screening examination  -we will schedule patient for colonoscopy the same time as upper endoscopy  -discussed with him the risks of the procedures including bleeding, surgery, perforation, missed polyp detection rate    3  Elevated LFTs:  Secondary to excessive alcohol consumption  -encouraged the patient to continue to minimize alcohol             _____________________________________________________________        CC:  Right upper quadrant abdominal pain and colon cancer screening    HPI:  Kenneth Doss is a 48 y o male who was referred for evaluation of right upper quadrant bowel pain and colon cancer screening  As you know this is a pleasant 72-year-old gentleman otherwise in good health with history of hypertension who reports a longstanding history of right upper quadrant pain greater than 10 years  Reports history of alpha-1 antitrypsin deficiency  Has been seen by GI and hepatology the past for the right upper quadrant pain    He notes that in the past cc occur postprandially but over the most recent years has become more persistent  He now has pain more often than not, not associated with eating  He clearly describes a positional component, worse when sitting down or driving, better when standing upright  No change with bowel movements  No associated nausea or vomiting  No radiation  He describes the right sided dull achy pain under his right costal margin  He has had ultrasounds and CT scans in the past which have been unremarkable except for hepatomegaly which has been noted to be increasing over the years from 15 to now 19 centimeters over the last 5 years  He has also had mild elevation of LFTs  He previously was drinking 6 to 8 beers every day, he recently stopped over the last 1 month  Patient denies any prior surgical history  Denies any tobacco   Currently is down to drinking about 9 drinks per week  He works as a   Family history is negative for GI or associated malignancies  Patient has never had a colonoscopy  He denies any nausea, vomiting, heartburn, dysphagia, diarrhea, constipation, melena, rectal bleeding  ROS:  The remainder of the ROS was negative except for the pertinent positives mentioned in HPI  Allergies: Patient has no known allergies      Medications:   Current Outpatient Medications:     amLODIPine (NORVASC) 5 mg tablet, Take 1 tablet (5 mg total) by mouth daily, Disp: 30 tablet, Rfl: 5    Ascorbic Acid (VITAMIN C) 500 MG CAPS, Take 1 capsule by mouth daily, Disp: , Rfl:     hydrocortisone (WESTCORT) 0 2 % cream, , Disp: , Rfl:     hydrocortisone valerate (WEST-GA) 0 2 % ointment, As needed prefers the cream, Disp: , Rfl:     multivitamin (THERAGRAN) TABS, Take 1 tablet by mouth daily, Disp: , Rfl:     Prednicarbate 0 1 % CREA, Apply topically 2 (two) times a day, Disp: , Rfl:     Na Sulfate-K Sulfate-Mg Sulf (Suprep Bowel Prep Kit) 17 5-3 13-1 6 GM/177ML SOLN, Take 1 Bottle by mouth once for 1 dose, Disp: 1 Bottle, Rfl: 0'    Past Medical History:   Diagnosis Date    Essential hypertension        Past Surgical History:   Procedure Laterality Date    CATARACT EXTRACTION Left     KNEE SURGERY Left        Family History   Problem Relation Age of Onset    No Known Problems Mother     No Known Problems Father     Colon cancer Family     Diabetes Family         Mellitus    Colon cancer Maternal Grandmother         reports that he has quit smoking  His smoking use included cigarettes  He has never used smokeless tobacco  He reports current alcohol use  He reports that he does not use drugs            Physical Exam:     Pulse 82   Temp 97 8 °F (36 6 °C)   Ht 5' 9" (1 753 m)   Wt 94 3 kg (208 lb)   BMI 30 72 kg/m²     Gen: wn/wd, NAD, healthy-appearing male  HEENT: anicteric, MMM, no cervical LAD  CVS: RRR, no m/r/g  CHEST: CTA b/l  ABD: +BS, soft, mildly reproducible right upper quadrant pain under his right costal margin, no hepatosplenomegaly  EXT: no c/c/e  NEURO: aaox3  SKIN: NO rashes

## 2020-09-29 ENCOUNTER — ANESTHESIA EVENT (OUTPATIENT)
Dept: GASTROENTEROLOGY | Facility: AMBULARY SURGERY CENTER | Age: 50
End: 2020-09-29

## 2020-10-05 ENCOUNTER — HOSPITAL ENCOUNTER (OUTPATIENT)
Dept: GASTROENTEROLOGY | Facility: AMBULARY SURGERY CENTER | Age: 50
Setting detail: OUTPATIENT SURGERY
Discharge: HOME | End: 2020-10-05
Attending: INTERNAL MEDICINE | Admitting: INTERNAL MEDICINE
Payer: COMMERCIAL

## 2020-10-05 ENCOUNTER — ANESTHESIA (OUTPATIENT)
Dept: GASTROENTEROLOGY | Facility: AMBULARY SURGERY CENTER | Age: 50
End: 2020-10-05

## 2020-10-05 VITALS
RESPIRATION RATE: 20 BRPM | OXYGEN SATURATION: 95 % | DIASTOLIC BLOOD PRESSURE: 81 MMHG | BODY MASS INDEX: 29.92 KG/M2 | TEMPERATURE: 97 F | SYSTOLIC BLOOD PRESSURE: 117 MMHG | HEIGHT: 69 IN | WEIGHT: 202 LBS | HEART RATE: 107 BPM

## 2020-10-05 VITALS — HEART RATE: 93 BPM

## 2020-10-05 DIAGNOSIS — R10.11 RIGHT UPPER QUADRANT ABDOMINAL PAIN: ICD-10-CM

## 2020-10-05 DIAGNOSIS — Z12.11 COLON CANCER SCREENING: ICD-10-CM

## 2020-10-05 DIAGNOSIS — K76.0 FATTY LIVER: ICD-10-CM

## 2020-10-05 PROCEDURE — 43239 EGD BIOPSY SINGLE/MULTIPLE: CPT | Performed by: INTERNAL MEDICINE

## 2020-10-05 PROCEDURE — 88305 TISSUE EXAM BY PATHOLOGIST: CPT | Performed by: PATHOLOGY

## 2020-10-05 PROCEDURE — 45380 COLONOSCOPY AND BIOPSY: CPT | Performed by: INTERNAL MEDICINE

## 2020-10-05 PROCEDURE — NC001 PR NO CHARGE: Performed by: INTERNAL MEDICINE

## 2020-10-05 RX ORDER — PROPOFOL 10 MG/ML
INJECTION, EMULSION INTRAVENOUS AS NEEDED
Status: DISCONTINUED | OUTPATIENT
Start: 2020-10-05 | End: 2020-10-05

## 2020-10-05 RX ORDER — PROPOFOL 10 MG/ML
INJECTION, EMULSION INTRAVENOUS CONTINUOUS PRN
Status: DISCONTINUED | OUTPATIENT
Start: 2020-10-05 | End: 2020-10-05

## 2020-10-05 RX ORDER — PANTOPRAZOLE SODIUM 40 MG/1
40 TABLET, DELAYED RELEASE ORAL DAILY
Qty: 30 TABLET | Refills: 3 | Status: SHIPPED | OUTPATIENT
Start: 2020-10-05 | End: 2021-01-24

## 2020-10-05 RX ORDER — SODIUM CHLORIDE, SODIUM LACTATE, POTASSIUM CHLORIDE, CALCIUM CHLORIDE 600; 310; 30; 20 MG/100ML; MG/100ML; MG/100ML; MG/100ML
125 INJECTION, SOLUTION INTRAVENOUS CONTINUOUS
Status: DISCONTINUED | OUTPATIENT
Start: 2020-10-05 | End: 2020-10-09 | Stop reason: HOSPADM

## 2020-10-05 RX ORDER — LIDOCAINE HYDROCHLORIDE 10 MG/ML
INJECTION, SOLUTION EPIDURAL; INFILTRATION; INTRACAUDAL; PERINEURAL AS NEEDED
Status: DISCONTINUED | OUTPATIENT
Start: 2020-10-05 | End: 2020-10-05

## 2020-10-05 RX ORDER — GLYCOPYRROLATE 0.2 MG/ML
INJECTION INTRAMUSCULAR; INTRAVENOUS AS NEEDED
Status: DISCONTINUED | OUTPATIENT
Start: 2020-10-05 | End: 2020-10-05

## 2020-10-05 RX ADMIN — GLYCOPYRROLATE 0.2 MG: 0.2 INJECTION, SOLUTION INTRAMUSCULAR; INTRAVENOUS at 10:45

## 2020-10-05 RX ADMIN — SODIUM CHLORIDE, SODIUM LACTATE, POTASSIUM CHLORIDE, AND CALCIUM CHLORIDE 125 ML/HR: .6; .31; .03; .02 INJECTION, SOLUTION INTRAVENOUS at 10:07

## 2020-10-05 RX ADMIN — PROPOFOL 200 MG: 10 INJECTION, EMULSION INTRAVENOUS at 10:47

## 2020-10-05 RX ADMIN — LIDOCAINE HYDROCHLORIDE 100 MG: 10 INJECTION, SOLUTION EPIDURAL; INFILTRATION; INTRACAUDAL; PERINEURAL at 10:47

## 2020-10-05 RX ADMIN — PROPOFOL 150 MCG/KG/MIN: 10 INJECTION, EMULSION INTRAVENOUS at 10:48

## 2020-10-07 DIAGNOSIS — R10.11 RIGHT UPPER QUADRANT ABDOMINAL PAIN: Primary | ICD-10-CM

## 2020-10-08 ENCOUNTER — TELEPHONE (OUTPATIENT)
Dept: GASTROENTEROLOGY | Facility: CLINIC | Age: 50
End: 2020-10-08

## 2020-10-09 ENCOUNTER — TELEPHONE (OUTPATIENT)
Dept: GASTROENTEROLOGY | Facility: CLINIC | Age: 50
End: 2020-10-09

## 2020-10-10 ENCOUNTER — LAB (OUTPATIENT)
Dept: LAB | Facility: MEDICAL CENTER | Age: 50
End: 2020-10-10
Payer: COMMERCIAL

## 2020-10-10 DIAGNOSIS — K76.0 FATTY LIVER: ICD-10-CM

## 2020-10-10 LAB
ALBUMIN SERPL BCP-MCNC: 4.2 G/DL (ref 3.5–5)
ALP SERPL-CCNC: 53 U/L (ref 46–116)
ALT SERPL W P-5'-P-CCNC: 84 U/L (ref 12–78)
AST SERPL W P-5'-P-CCNC: 38 U/L (ref 5–45)
BILIRUB DIRECT SERPL-MCNC: 0.19 MG/DL (ref 0–0.2)
BILIRUB SERPL-MCNC: 0.84 MG/DL (ref 0.2–1)
HBV CORE AB SER QL: NORMAL
HBV CORE IGM SER QL: NORMAL
HBV SURFACE AG SER QL: NORMAL
HCV AB SER QL: NORMAL
INR PPP: 1.05 (ref 0.84–1.19)
IRON SATN MFR SERPL: 46 %
IRON SERPL-MCNC: 146 UG/DL (ref 65–175)
PROT SERPL-MCNC: 7.7 G/DL (ref 6.4–8.2)
PROTHROMBIN TIME: 13.7 SECONDS (ref 11.6–14.5)
TIBC SERPL-MCNC: 319 UG/DL (ref 250–450)
TSH SERPL DL<=0.05 MIU/L-ACNC: 0.99 UIU/ML (ref 0.36–3.74)

## 2020-10-10 PROCEDURE — 86376 MICROSOMAL ANTIBODY EACH: CPT

## 2020-10-10 PROCEDURE — 86704 HEP B CORE ANTIBODY TOTAL: CPT

## 2020-10-10 PROCEDURE — 86803 HEPATITIS C AB TEST: CPT

## 2020-10-10 PROCEDURE — 80076 HEPATIC FUNCTION PANEL: CPT

## 2020-10-10 PROCEDURE — 87340 HEPATITIS B SURFACE AG IA: CPT

## 2020-10-10 PROCEDURE — 86705 HEP B CORE ANTIBODY IGM: CPT

## 2020-10-10 PROCEDURE — 84443 ASSAY THYROID STIM HORMONE: CPT

## 2020-10-10 PROCEDURE — 86038 ANTINUCLEAR ANTIBODIES: CPT

## 2020-10-10 PROCEDURE — 83540 ASSAY OF IRON: CPT

## 2020-10-10 PROCEDURE — 83550 IRON BINDING TEST: CPT

## 2020-10-10 PROCEDURE — 82103 ALPHA-1-ANTITRYPSIN TOTAL: CPT

## 2020-10-10 PROCEDURE — 82390 ASSAY OF CERULOPLASMIN: CPT

## 2020-10-10 PROCEDURE — 85610 PROTHROMBIN TIME: CPT

## 2020-10-10 PROCEDURE — 36415 COLL VENOUS BLD VENIPUNCTURE: CPT

## 2020-10-10 PROCEDURE — 86235 NUCLEAR ANTIGEN ANTIBODY: CPT

## 2020-10-10 PROCEDURE — 86256 FLUORESCENT ANTIBODY TITER: CPT

## 2020-10-12 ENCOUNTER — TELEPHONE (OUTPATIENT)
Dept: GASTROENTEROLOGY | Facility: CLINIC | Age: 50
End: 2020-10-12

## 2020-10-12 LAB
A1AT SERPL-MCNC: 75 MG/DL (ref 101–187)
CERULOPLASMIN SERPL-MCNC: 16 MG/DL (ref 16–31)
LKM-1 AB SER-ACNC: <20.1 UNITS (ref 0–20)
RYE IGE QN: NEGATIVE

## 2020-10-13 LAB — MITOCHONDRIA M2 IGG SER-ACNC: <20 UNITS (ref 0–20)

## 2020-10-14 LAB — ACTIN IGG SERPL-ACNC: 5 UNITS (ref 0–19)

## 2020-10-15 ENCOUNTER — HOSPITAL ENCOUNTER (OUTPATIENT)
Dept: MRI IMAGING | Facility: HOSPITAL | Age: 50
Discharge: HOME | End: 2020-10-15
Attending: INTERNAL MEDICINE
Payer: COMMERCIAL

## 2020-10-15 DIAGNOSIS — R10.11 RIGHT UPPER QUADRANT ABDOMINAL PAIN: ICD-10-CM

## 2020-10-15 PROCEDURE — G1004 CDSM NDSC: HCPCS

## 2020-10-15 PROCEDURE — A9585 GADOBUTROL INJECTION: HCPCS | Performed by: INTERNAL MEDICINE

## 2020-10-15 PROCEDURE — 74183 MRI ABD W/O CNTR FLWD CNTR: CPT

## 2020-10-15 RX ADMIN — GADOBUTROL 9 ML: 604.72 INJECTION INTRAVENOUS at 09:38

## 2020-10-18 DIAGNOSIS — R79.89 ELEVATED LFTS: Primary | ICD-10-CM

## 2020-10-22 ENCOUNTER — TELEPHONE (OUTPATIENT)
Dept: GASTROENTEROLOGY | Facility: CLINIC | Age: 50
End: 2020-10-22

## 2020-10-23 ENCOUNTER — TELEPHONE (OUTPATIENT)
Dept: GASTROENTEROLOGY | Facility: CLINIC | Age: 50
End: 2020-10-23

## 2020-10-24 ENCOUNTER — APPOINTMENT (OUTPATIENT)
Dept: LAB | Facility: MEDICAL CENTER | Age: 50
End: 2020-10-24
Payer: COMMERCIAL

## 2020-10-24 DIAGNOSIS — R79.89 ELEVATED LFTS: ICD-10-CM

## 2020-10-24 PROCEDURE — 82103 ALPHA-1-ANTITRYPSIN TOTAL: CPT

## 2020-10-24 PROCEDURE — 36415 COLL VENOUS BLD VENIPUNCTURE: CPT

## 2020-10-24 PROCEDURE — 82104 ALPHA-1-ANTITRYPSIN PHENO: CPT

## 2020-10-29 LAB
A1AT PHENOTYP SERPL IFE: ABNORMAL
A1AT SERPL-MCNC: 79 MG/DL (ref 101–187)

## 2020-11-19 ENCOUNTER — ANESTHESIA EVENT (OUTPATIENT)
Dept: GASTROENTEROLOGY | Facility: HOSPITAL | Age: 50
End: 2020-11-19

## 2020-11-19 ENCOUNTER — ANESTHESIA (OUTPATIENT)
Dept: GASTROENTEROLOGY | Facility: HOSPITAL | Age: 50
End: 2020-11-19

## 2020-11-19 ENCOUNTER — HOSPITAL ENCOUNTER (OUTPATIENT)
Dept: GASTROENTEROLOGY | Facility: HOSPITAL | Age: 50
Setting detail: OUTPATIENT SURGERY
Discharge: HOME | End: 2020-11-19
Attending: INTERNAL MEDICINE | Admitting: INTERNAL MEDICINE
Payer: COMMERCIAL

## 2020-11-19 VITALS
DIASTOLIC BLOOD PRESSURE: 77 MMHG | HEART RATE: 98 BPM | TEMPERATURE: 97 F | SYSTOLIC BLOOD PRESSURE: 113 MMHG | OXYGEN SATURATION: 96 % | RESPIRATION RATE: 16 BRPM

## 2020-11-19 VITALS — HEART RATE: 97 BPM

## 2020-11-19 DIAGNOSIS — D13.5 AMPULLARY ADENOMA: ICD-10-CM

## 2020-11-19 PROCEDURE — 43237 ENDOSCOPIC US EXAM ESOPH: CPT | Performed by: INTERNAL MEDICINE

## 2020-11-19 RX ORDER — SODIUM CHLORIDE, SODIUM LACTATE, POTASSIUM CHLORIDE, CALCIUM CHLORIDE 600; 310; 30; 20 MG/100ML; MG/100ML; MG/100ML; MG/100ML
INJECTION, SOLUTION INTRAVENOUS CONTINUOUS PRN
Status: DISCONTINUED | OUTPATIENT
Start: 2020-11-19 | End: 2020-11-19

## 2020-11-19 RX ORDER — FENTANYL CITRATE 50 UG/ML
INJECTION, SOLUTION INTRAMUSCULAR; INTRAVENOUS AS NEEDED
Status: DISCONTINUED | OUTPATIENT
Start: 2020-11-19 | End: 2020-11-19

## 2020-11-19 RX ORDER — PROPOFOL 10 MG/ML
INJECTION, EMULSION INTRAVENOUS AS NEEDED
Status: DISCONTINUED | OUTPATIENT
Start: 2020-11-19 | End: 2020-11-19

## 2020-11-19 RX ORDER — PROPOFOL 10 MG/ML
INJECTION, EMULSION INTRAVENOUS CONTINUOUS PRN
Status: DISCONTINUED | OUTPATIENT
Start: 2020-11-19 | End: 2020-11-19

## 2020-11-19 RX ORDER — SODIUM CHLORIDE 9 MG/ML
INJECTION, SOLUTION INTRAVENOUS CONTINUOUS PRN
Status: DISCONTINUED | OUTPATIENT
Start: 2020-11-19 | End: 2020-11-19

## 2020-11-19 RX ADMIN — PROPOFOL: 10 INJECTION, EMULSION INTRAVENOUS at 12:48

## 2020-11-19 RX ADMIN — PROPOFOL 40 MG: 10 INJECTION, EMULSION INTRAVENOUS at 12:11

## 2020-11-19 RX ADMIN — PROPOFOL 40 MG: 10 INJECTION, EMULSION INTRAVENOUS at 12:17

## 2020-11-19 RX ADMIN — PROPOFOL 40 MG: 10 INJECTION, EMULSION INTRAVENOUS at 12:09

## 2020-11-19 RX ADMIN — PROPOFOL 250 MCG/KG/MIN: 10 INJECTION, EMULSION INTRAVENOUS at 12:17

## 2020-11-19 RX ADMIN — PROPOFOL 50 MG: 10 INJECTION, EMULSION INTRAVENOUS at 12:07

## 2020-11-19 RX ADMIN — SODIUM CHLORIDE: 0.9 INJECTION, SOLUTION INTRAVENOUS at 11:58

## 2020-11-19 RX ADMIN — SODIUM CHLORIDE, SODIUM LACTATE, POTASSIUM CHLORIDE, AND CALCIUM CHLORIDE: .6; .31; .03; .02 INJECTION, SOLUTION INTRAVENOUS at 12:41

## 2020-11-19 RX ADMIN — PROPOFOL 50 MG: 10 INJECTION, EMULSION INTRAVENOUS at 12:03

## 2020-11-19 RX ADMIN — PROPOFOL 40 MG: 10 INJECTION, EMULSION INTRAVENOUS at 12:15

## 2020-11-19 RX ADMIN — PROPOFOL 100 MG: 10 INJECTION, EMULSION INTRAVENOUS at 12:02

## 2020-11-19 RX ADMIN — PROPOFOL 50 MG: 10 INJECTION, EMULSION INTRAVENOUS at 12:05

## 2020-11-19 RX ADMIN — FENTANYL CITRATE 50 MCG: 50 INJECTION INTRAMUSCULAR; INTRAVENOUS at 12:31

## 2020-11-19 RX ADMIN — PROPOFOL 50 MG: 10 INJECTION, EMULSION INTRAVENOUS at 12:04

## 2020-11-20 ENCOUNTER — TELEPHONE (OUTPATIENT)
Dept: GASTROENTEROLOGY | Facility: AMBULARY SURGERY CENTER | Age: 50
End: 2020-11-20

## 2020-12-15 ENCOUNTER — ANESTHESIA EVENT (OUTPATIENT)
Dept: GASTROENTEROLOGY | Facility: HOSPITAL | Age: 50
End: 2020-12-15
Payer: COMMERCIAL

## 2020-12-15 RX ORDER — SODIUM CHLORIDE 9 MG/ML
125 INJECTION, SOLUTION INTRAVENOUS CONTINUOUS
Status: CANCELLED | OUTPATIENT
Start: 2020-12-15

## 2020-12-16 ENCOUNTER — HOSPITAL ENCOUNTER (OUTPATIENT)
Dept: GASTROENTEROLOGY | Facility: HOSPITAL | Age: 50
Setting detail: OBSERVATION
Discharge: HOME | End: 2020-12-17
Attending: INTERNAL MEDICINE | Admitting: INTERNAL MEDICINE
Payer: COMMERCIAL

## 2020-12-16 ENCOUNTER — APPOINTMENT (OUTPATIENT)
Dept: RADIOLOGY | Facility: HOSPITAL | Age: 50
End: 2020-12-16
Payer: COMMERCIAL

## 2020-12-16 ENCOUNTER — ANESTHESIA (OUTPATIENT)
Dept: GASTROENTEROLOGY | Facility: HOSPITAL | Age: 50
End: 2020-12-16
Payer: COMMERCIAL

## 2020-12-16 VITALS — HEART RATE: 274 BPM

## 2020-12-16 DIAGNOSIS — D13.5 AMPULLARY ADENOMA: ICD-10-CM

## 2020-12-16 PROCEDURE — 43274 ERCP DUCT STENT PLACEMENT: CPT | Performed by: INTERNAL MEDICINE

## 2020-12-16 PROCEDURE — 99219 PR INITIAL OBSERVATION CARE/DAY 50 MINUTES: CPT | Performed by: INTERNAL MEDICINE

## 2020-12-16 PROCEDURE — NC001 PR NO CHARGE: Performed by: INTERNAL MEDICINE

## 2020-12-16 PROCEDURE — C1769 GUIDE WIRE: HCPCS

## 2020-12-16 PROCEDURE — C2617 STENT, NON-COR, TEM W/O DEL: HCPCS

## 2020-12-16 PROCEDURE — 88305 TISSUE EXAM BY PATHOLOGIST: CPT | Performed by: PATHOLOGY

## 2020-12-16 PROCEDURE — 43261 ENDO CHOLANGIOPANCREATOGRAPH: CPT | Performed by: INTERNAL MEDICINE

## 2020-12-16 PROCEDURE — 74330 X-RAY BILE/PANC ENDOSCOPY: CPT

## 2020-12-16 PROCEDURE — G0379 DIRECT REFER HOSPITAL OBSERV: HCPCS

## 2020-12-16 RX ORDER — ASCORBIC ACID 500 MG
500 TABLET ORAL DAILY
Status: DISCONTINUED | OUTPATIENT
Start: 2020-12-16 | End: 2020-12-17 | Stop reason: HOSPADM

## 2020-12-16 RX ORDER — LIDOCAINE HYDROCHLORIDE 10 MG/ML
INJECTION, SOLUTION EPIDURAL; INFILTRATION; INTRACAUDAL; PERINEURAL AS NEEDED
Status: DISCONTINUED | OUTPATIENT
Start: 2020-12-16 | End: 2020-12-16

## 2020-12-16 RX ORDER — PANTOPRAZOLE SODIUM 40 MG/1
40 TABLET, DELAYED RELEASE ORAL DAILY
Status: DISCONTINUED | OUTPATIENT
Start: 2020-12-16 | End: 2020-12-17 | Stop reason: HOSPADM

## 2020-12-16 RX ORDER — DEXAMETHASONE SODIUM PHOSPHATE 10 MG/ML
INJECTION, SOLUTION INTRAMUSCULAR; INTRAVENOUS AS NEEDED
Status: DISCONTINUED | OUTPATIENT
Start: 2020-12-16 | End: 2020-12-16

## 2020-12-16 RX ORDER — PROPOFOL 10 MG/ML
INJECTION, EMULSION INTRAVENOUS AS NEEDED
Status: DISCONTINUED | OUTPATIENT
Start: 2020-12-16 | End: 2020-12-16

## 2020-12-16 RX ORDER — ONDANSETRON 2 MG/ML
INJECTION INTRAMUSCULAR; INTRAVENOUS AS NEEDED
Status: DISCONTINUED | OUTPATIENT
Start: 2020-12-16 | End: 2020-12-16

## 2020-12-16 RX ORDER — SODIUM CHLORIDE, SODIUM LACTATE, POTASSIUM CHLORIDE, CALCIUM CHLORIDE 600; 310; 30; 20 MG/100ML; MG/100ML; MG/100ML; MG/100ML
100 INJECTION, SOLUTION INTRAVENOUS CONTINUOUS
Status: DISCONTINUED | OUTPATIENT
Start: 2020-12-16 | End: 2020-12-17 | Stop reason: HOSPADM

## 2020-12-16 RX ORDER — ESMOLOL HYDROCHLORIDE 10 MG/ML
INJECTION INTRAVENOUS AS NEEDED
Status: DISCONTINUED | OUTPATIENT
Start: 2020-12-16 | End: 2020-12-16

## 2020-12-16 RX ORDER — SUCCINYLCHOLINE/SOD CL,ISO/PF 100 MG/5ML
SYRINGE (ML) INTRAVENOUS AS NEEDED
Status: DISCONTINUED | OUTPATIENT
Start: 2020-12-16 | End: 2020-12-16

## 2020-12-16 RX ORDER — AMLODIPINE BESYLATE 5 MG/1
5 TABLET ORAL DAILY
Status: DISCONTINUED | OUTPATIENT
Start: 2020-12-16 | End: 2020-12-17 | Stop reason: HOSPADM

## 2020-12-16 RX ORDER — PROPOFOL 10 MG/ML
INJECTION, EMULSION INTRAVENOUS CONTINUOUS PRN
Status: DISCONTINUED | OUTPATIENT
Start: 2020-12-16 | End: 2020-12-16

## 2020-12-16 RX ORDER — GLYCOPYRROLATE 0.2 MG/ML
INJECTION INTRAMUSCULAR; INTRAVENOUS AS NEEDED
Status: DISCONTINUED | OUTPATIENT
Start: 2020-12-16 | End: 2020-12-16

## 2020-12-16 RX ORDER — SODIUM CHLORIDE 9 MG/ML
INJECTION, SOLUTION INTRAVENOUS CONTINUOUS PRN
Status: DISCONTINUED | OUTPATIENT
Start: 2020-12-16 | End: 2020-12-16

## 2020-12-16 RX ORDER — ROCURONIUM BROMIDE 10 MG/ML
INJECTION, SOLUTION INTRAVENOUS AS NEEDED
Status: DISCONTINUED | OUTPATIENT
Start: 2020-12-16 | End: 2020-12-16

## 2020-12-16 RX ORDER — SODIUM CHLORIDE, SODIUM LACTATE, POTASSIUM CHLORIDE, CALCIUM CHLORIDE 600; 310; 30; 20 MG/100ML; MG/100ML; MG/100ML; MG/100ML
INJECTION, SOLUTION INTRAVENOUS CONTINUOUS PRN
Status: DISCONTINUED | OUTPATIENT
Start: 2020-12-16 | End: 2020-12-16

## 2020-12-16 RX ADMIN — GLUCAGON HYDROCHLORIDE 0.5 MG: KIT at 13:24

## 2020-12-16 RX ADMIN — SODIUM CHLORIDE, SODIUM LACTATE, POTASSIUM CHLORIDE, AND CALCIUM CHLORIDE: .6; .31; .03; .02 INJECTION, SOLUTION INTRAVENOUS at 12:50

## 2020-12-16 RX ADMIN — GLYCOPYRROLATE 0.2 MG: 0.2 INJECTION, SOLUTION INTRAMUSCULAR; INTRAVENOUS at 12:50

## 2020-12-16 RX ADMIN — AMLODIPINE BESYLATE 5 MG: 5 TABLET ORAL at 18:19

## 2020-12-16 RX ADMIN — OXYCODONE HYDROCHLORIDE AND ACETAMINOPHEN 500 MG: 500 TABLET ORAL at 18:20

## 2020-12-16 RX ADMIN — LIDOCAINE HYDROCHLORIDE 90 MG: 10 INJECTION, SOLUTION EPIDURAL; INFILTRATION; INTRACAUDAL; PERINEURAL at 12:52

## 2020-12-16 RX ADMIN — Medication 100 MG: at 12:53

## 2020-12-16 RX ADMIN — SODIUM CHLORIDE: 0.9 INJECTION, SOLUTION INTRAVENOUS at 13:49

## 2020-12-16 RX ADMIN — ROCURONIUM BROMIDE 30 MG: 50 INJECTION, SOLUTION INTRAVENOUS at 13:58

## 2020-12-16 RX ADMIN — SODIUM CHLORIDE, SODIUM LACTATE, POTASSIUM CHLORIDE, AND CALCIUM CHLORIDE 100 ML/HR: .6; .31; .03; .02 INJECTION, SOLUTION INTRAVENOUS at 16:00

## 2020-12-16 RX ADMIN — SODIUM CHLORIDE, SODIUM LACTATE, POTASSIUM CHLORIDE, AND CALCIUM CHLORIDE: .6; .31; .03; .02 INJECTION, SOLUTION INTRAVENOUS at 13:15

## 2020-12-16 RX ADMIN — PANTOPRAZOLE SODIUM 40 MG: 40 TABLET, DELAYED RELEASE ORAL at 18:20

## 2020-12-16 RX ADMIN — SUGAMMADEX 200 MG: 100 INJECTION, SOLUTION INTRAVENOUS at 14:11

## 2020-12-16 RX ADMIN — PROPOFOL 50 MCG/KG/MIN: 10 INJECTION, EMULSION INTRAVENOUS at 13:35

## 2020-12-16 RX ADMIN — INDOMETHACIN 100 MG: 50 SUPPOSITORY RECTAL at 13:06

## 2020-12-16 RX ADMIN — ROCURONIUM BROMIDE 10 MG: 50 INJECTION, SOLUTION INTRAVENOUS at 13:35

## 2020-12-16 RX ADMIN — DEXAMETHASONE SODIUM PHOSPHATE 5 MG: 10 INJECTION, SOLUTION INTRAMUSCULAR; INTRAVENOUS at 13:03

## 2020-12-16 RX ADMIN — ESMOLOL HYDROCHLORIDE 30 MG: 100 INJECTION, SOLUTION INTRAVENOUS at 12:53

## 2020-12-16 RX ADMIN — PROPOFOL 200 MG: 10 INJECTION, EMULSION INTRAVENOUS at 12:53

## 2020-12-16 RX ADMIN — ONDANSETRON 4 MG: 2 INJECTION INTRAMUSCULAR; INTRAVENOUS at 13:03

## 2020-12-16 RX ADMIN — ROCURONIUM BROMIDE 40 MG: 50 INJECTION, SOLUTION INTRAVENOUS at 12:59

## 2020-12-16 RX ADMIN — IOHEXOL 3 ML: 240 INJECTION, SOLUTION INTRATHECAL; INTRAVASCULAR; INTRAVENOUS; ORAL at 13:15

## 2020-12-16 RX ADMIN — PROPOFOL 70 MCG/KG/MIN: 10 INJECTION, EMULSION INTRAVENOUS at 12:59

## 2020-12-17 ENCOUNTER — PREP FOR PROCEDURE (OUTPATIENT)
Dept: GASTROENTEROLOGY | Facility: CLINIC | Age: 50
End: 2020-12-17

## 2020-12-17 VITALS
WEIGHT: 210 LBS | HEIGHT: 69 IN | DIASTOLIC BLOOD PRESSURE: 90 MMHG | HEART RATE: 105 BPM | OXYGEN SATURATION: 93 % | SYSTOLIC BLOOD PRESSURE: 138 MMHG | RESPIRATION RATE: 17 BRPM | TEMPERATURE: 98.6 F | BODY MASS INDEX: 31.1 KG/M2

## 2020-12-17 DIAGNOSIS — D13.5 AMPULLARY ADENOMA: Primary | ICD-10-CM

## 2020-12-17 LAB
ALBUMIN SERPL BCP-MCNC: 3.7 G/DL (ref 3.5–5)
ALP SERPL-CCNC: 44 U/L (ref 46–116)
ALT SERPL W P-5'-P-CCNC: 81 U/L (ref 12–78)
ANION GAP SERPL CALCULATED.3IONS-SCNC: 5 MMOL/L (ref 4–13)
AST SERPL W P-5'-P-CCNC: 44 U/L (ref 5–45)
BASOPHILS # BLD AUTO: 0.01 THOUSANDS/ΜL (ref 0–0.1)
BASOPHILS NFR BLD AUTO: 0 % (ref 0–1)
BILIRUB SERPL-MCNC: 1.41 MG/DL (ref 0.2–1)
BUN SERPL-MCNC: 12 MG/DL (ref 5–25)
CALCIUM SERPL-MCNC: 9.4 MG/DL (ref 8.3–10.1)
CHLORIDE SERPL-SCNC: 106 MMOL/L (ref 100–108)
CO2 SERPL-SCNC: 27 MMOL/L (ref 21–32)
CREAT SERPL-MCNC: 0.96 MG/DL (ref 0.6–1.3)
EOSINOPHIL # BLD AUTO: 0 THOUSAND/ΜL (ref 0–0.61)
EOSINOPHIL NFR BLD AUTO: 0 % (ref 0–6)
ERYTHROCYTE [DISTWIDTH] IN BLOOD BY AUTOMATED COUNT: 12.1 % (ref 11.6–15.1)
GFR SERPL CREATININE-BSD FRML MDRD: 92 ML/MIN/1.73SQ M
GLUCOSE P FAST SERPL-MCNC: 123 MG/DL (ref 65–99)
GLUCOSE SERPL-MCNC: 123 MG/DL (ref 65–140)
HCT VFR BLD AUTO: 45 % (ref 36.5–49.3)
HGB BLD-MCNC: 15.4 G/DL (ref 12–17)
IMM GRANULOCYTES # BLD AUTO: 0.05 THOUSAND/UL (ref 0–0.2)
IMM GRANULOCYTES NFR BLD AUTO: 0 % (ref 0–2)
LYMPHOCYTES # BLD AUTO: 1.06 THOUSANDS/ΜL (ref 0.6–4.47)
LYMPHOCYTES NFR BLD AUTO: 10 % (ref 14–44)
MCH RBC QN AUTO: 31.5 PG (ref 26.8–34.3)
MCHC RBC AUTO-ENTMCNC: 34.2 G/DL (ref 31.4–37.4)
MCV RBC AUTO: 92 FL (ref 82–98)
MONOCYTES # BLD AUTO: 0.71 THOUSAND/ΜL (ref 0.17–1.22)
MONOCYTES NFR BLD AUTO: 6 % (ref 4–12)
NEUTROPHILS # BLD AUTO: 9.31 THOUSANDS/ΜL (ref 1.85–7.62)
NEUTS SEG NFR BLD AUTO: 84 % (ref 43–75)
NRBC BLD AUTO-RTO: 0 /100 WBCS
PLATELET # BLD AUTO: 216 THOUSANDS/UL (ref 149–390)
PMV BLD AUTO: 10.2 FL (ref 8.9–12.7)
POTASSIUM SERPL-SCNC: 3.7 MMOL/L (ref 3.5–5.3)
PROT SERPL-MCNC: 7 G/DL (ref 6.4–8.2)
RBC # BLD AUTO: 4.89 MILLION/UL (ref 3.88–5.62)
SODIUM SERPL-SCNC: 138 MMOL/L (ref 136–145)
WBC # BLD AUTO: 11.14 THOUSAND/UL (ref 4.31–10.16)

## 2020-12-17 PROCEDURE — 99217 PR OBSERVATION CARE DISCHARGE MANAGEMENT: CPT | Performed by: FAMILY MEDICINE

## 2020-12-17 PROCEDURE — 80053 COMPREHEN METABOLIC PANEL: CPT | Performed by: INTERNAL MEDICINE

## 2020-12-17 PROCEDURE — 85025 COMPLETE CBC W/AUTO DIFF WBC: CPT | Performed by: INTERNAL MEDICINE

## 2020-12-17 PROCEDURE — 99244 OFF/OP CNSLTJ NEW/EST MOD 40: CPT | Performed by: INTERNAL MEDICINE

## 2020-12-17 RX ADMIN — SODIUM CHLORIDE, SODIUM LACTATE, POTASSIUM CHLORIDE, AND CALCIUM CHLORIDE 100 ML/HR: .6; .31; .03; .02 INJECTION, SOLUTION INTRAVENOUS at 03:30

## 2020-12-17 RX ADMIN — PANTOPRAZOLE SODIUM 40 MG: 40 TABLET, DELAYED RELEASE ORAL at 09:48

## 2020-12-17 RX ADMIN — OXYCODONE HYDROCHLORIDE AND ACETAMINOPHEN 500 MG: 500 TABLET ORAL at 09:49

## 2020-12-17 RX ADMIN — AMLODIPINE BESYLATE 5 MG: 5 TABLET ORAL at 09:48

## 2020-12-18 ENCOUNTER — TRANSITIONAL CARE MANAGEMENT (OUTPATIENT)
Dept: INTERNAL MEDICINE CLINIC | Facility: CLINIC | Age: 50
End: 2020-12-18

## 2020-12-21 ENCOUNTER — TELEPHONE (OUTPATIENT)
Dept: GASTROENTEROLOGY | Facility: AMBULARY SURGERY CENTER | Age: 50
End: 2020-12-21

## 2020-12-22 ENCOUNTER — TELEPHONE (OUTPATIENT)
Dept: GASTROENTEROLOGY | Facility: CLINIC | Age: 50
End: 2020-12-22

## 2020-12-22 ENCOUNTER — OFFICE VISIT (OUTPATIENT)
Dept: INTERNAL MEDICINE CLINIC | Facility: CLINIC | Age: 50
End: 2020-12-22
Payer: COMMERCIAL

## 2020-12-22 VITALS
BODY MASS INDEX: 29.83 KG/M2 | DIASTOLIC BLOOD PRESSURE: 80 MMHG | TEMPERATURE: 98.8 F | WEIGHT: 201.4 LBS | HEART RATE: 110 BPM | OXYGEN SATURATION: 96 % | SYSTOLIC BLOOD PRESSURE: 118 MMHG | HEIGHT: 69 IN

## 2020-12-22 DIAGNOSIS — D72.829 LEUKOCYTOSIS, UNSPECIFIED TYPE: ICD-10-CM

## 2020-12-22 DIAGNOSIS — R17 ELEVATED BILIRUBIN: Primary | ICD-10-CM

## 2020-12-22 DIAGNOSIS — D13.5 AMPULLARY ADENOMA: ICD-10-CM

## 2020-12-22 PROCEDURE — 1111F DSCHRG MED/CURRENT MED MERGE: CPT | Performed by: INTERNAL MEDICINE

## 2020-12-22 PROCEDURE — 99495 TRANSJ CARE MGMT MOD F2F 14D: CPT | Performed by: INTERNAL MEDICINE

## 2020-12-26 ENCOUNTER — LAB (OUTPATIENT)
Dept: LAB | Facility: MEDICAL CENTER | Age: 50
End: 2020-12-26
Payer: COMMERCIAL

## 2020-12-26 DIAGNOSIS — D72.829 LEUKOCYTOSIS, UNSPECIFIED TYPE: ICD-10-CM

## 2020-12-26 DIAGNOSIS — R17 ELEVATED BILIRUBIN: ICD-10-CM

## 2020-12-26 LAB
ALBUMIN SERPL BCP-MCNC: 3.9 G/DL (ref 3.5–5)
ALP SERPL-CCNC: 54 U/L (ref 46–116)
ALT SERPL W P-5'-P-CCNC: 71 U/L (ref 12–78)
ANION GAP SERPL CALCULATED.3IONS-SCNC: 4 MMOL/L (ref 4–13)
AST SERPL W P-5'-P-CCNC: 30 U/L (ref 5–45)
BASOPHILS # BLD AUTO: 0.05 THOUSANDS/ΜL (ref 0–0.1)
BASOPHILS NFR BLD AUTO: 1 % (ref 0–1)
BILIRUB SERPL-MCNC: 0.6 MG/DL (ref 0.2–1)
BUN SERPL-MCNC: 14 MG/DL (ref 5–25)
CALCIUM SERPL-MCNC: 9.4 MG/DL (ref 8.3–10.1)
CHLORIDE SERPL-SCNC: 107 MMOL/L (ref 100–108)
CO2 SERPL-SCNC: 31 MMOL/L (ref 21–32)
CREAT SERPL-MCNC: 1.02 MG/DL (ref 0.6–1.3)
EOSINOPHIL # BLD AUTO: 0.13 THOUSAND/ΜL (ref 0–0.61)
EOSINOPHIL NFR BLD AUTO: 2 % (ref 0–6)
ERYTHROCYTE [DISTWIDTH] IN BLOOD BY AUTOMATED COUNT: 12 % (ref 11.6–15.1)
GFR SERPL CREATININE-BSD FRML MDRD: 85 ML/MIN/1.73SQ M
GLUCOSE P FAST SERPL-MCNC: 105 MG/DL (ref 65–99)
HCT VFR BLD AUTO: 46.7 % (ref 36.5–49.3)
HGB BLD-MCNC: 15.6 G/DL (ref 12–17)
IMM GRANULOCYTES # BLD AUTO: 0.04 THOUSAND/UL (ref 0–0.2)
IMM GRANULOCYTES NFR BLD AUTO: 1 % (ref 0–2)
LYMPHOCYTES # BLD AUTO: 2.39 THOUSANDS/ΜL (ref 0.6–4.47)
LYMPHOCYTES NFR BLD AUTO: 36 % (ref 14–44)
MCH RBC QN AUTO: 31.6 PG (ref 26.8–34.3)
MCHC RBC AUTO-ENTMCNC: 33.4 G/DL (ref 31.4–37.4)
MCV RBC AUTO: 95 FL (ref 82–98)
MONOCYTES # BLD AUTO: 0.77 THOUSAND/ΜL (ref 0.17–1.22)
MONOCYTES NFR BLD AUTO: 12 % (ref 4–12)
NEUTROPHILS # BLD AUTO: 3.26 THOUSANDS/ΜL (ref 1.85–7.62)
NEUTS SEG NFR BLD AUTO: 48 % (ref 43–75)
NRBC BLD AUTO-RTO: 0 /100 WBCS
PLATELET # BLD AUTO: 238 THOUSANDS/UL (ref 149–390)
PMV BLD AUTO: 10.4 FL (ref 8.9–12.7)
POTASSIUM SERPL-SCNC: 4.3 MMOL/L (ref 3.5–5.3)
PROT SERPL-MCNC: 7.3 G/DL (ref 6.4–8.2)
RBC # BLD AUTO: 4.93 MILLION/UL (ref 3.88–5.62)
SODIUM SERPL-SCNC: 142 MMOL/L (ref 136–145)
WBC # BLD AUTO: 6.64 THOUSAND/UL (ref 4.31–10.16)

## 2020-12-26 PROCEDURE — 85025 COMPLETE CBC W/AUTO DIFF WBC: CPT

## 2020-12-26 PROCEDURE — 80053 COMPREHEN METABOLIC PANEL: CPT

## 2020-12-26 PROCEDURE — 36415 COLL VENOUS BLD VENIPUNCTURE: CPT

## 2020-12-29 ENCOUNTER — TELEPHONE (OUTPATIENT)
Dept: GASTROENTEROLOGY | Facility: MEDICAL CENTER | Age: 50
End: 2020-12-29

## 2021-01-22 DIAGNOSIS — R10.11 RIGHT UPPER QUADRANT ABDOMINAL PAIN: ICD-10-CM

## 2021-01-24 RX ORDER — PANTOPRAZOLE SODIUM 40 MG/1
TABLET, DELAYED RELEASE ORAL
Qty: 30 TABLET | Refills: 3 | Status: SHIPPED | OUTPATIENT
Start: 2021-01-24 | End: 2021-11-22

## 2021-01-26 ENCOUNTER — HOSPITAL ENCOUNTER (OUTPATIENT)
Dept: GASTROENTEROLOGY | Facility: HOSPITAL | Age: 51
Setting detail: OUTPATIENT SURGERY
Discharge: HOME | End: 2021-01-26
Admitting: INTERNAL MEDICINE
Payer: COMMERCIAL

## 2021-01-26 ENCOUNTER — HOSPITAL ENCOUNTER (OUTPATIENT)
Dept: RADIOLOGY | Facility: HOSPITAL | Age: 51
Discharge: HOME | End: 2021-01-26
Payer: COMMERCIAL

## 2021-01-26 ENCOUNTER — ANESTHESIA (OUTPATIENT)
Dept: GASTROENTEROLOGY | Facility: HOSPITAL | Age: 51
End: 2021-01-26

## 2021-01-26 ENCOUNTER — ANESTHESIA EVENT (OUTPATIENT)
Dept: GASTROENTEROLOGY | Facility: HOSPITAL | Age: 51
End: 2021-01-26

## 2021-01-26 VITALS
HEART RATE: 85 BPM | DIASTOLIC BLOOD PRESSURE: 81 MMHG | OXYGEN SATURATION: 92 % | BODY MASS INDEX: 29.77 KG/M2 | WEIGHT: 201 LBS | HEIGHT: 69 IN | TEMPERATURE: 96.9 F | RESPIRATION RATE: 18 BRPM | SYSTOLIC BLOOD PRESSURE: 123 MMHG

## 2021-01-26 VITALS — HEART RATE: 103 BPM

## 2021-01-26 DIAGNOSIS — D13.5 AMPULLARY ADENOMA: ICD-10-CM

## 2021-01-26 PROCEDURE — 74328 X-RAY BILE DUCT ENDOSCOPY: CPT

## 2021-01-26 PROCEDURE — 43275 ERCP REMOVE FORGN BODY DUCT: CPT | Performed by: INTERNAL MEDICINE

## 2021-01-26 PROCEDURE — 88305 TISSUE EXAM BY PATHOLOGIST: CPT | Performed by: PATHOLOGY

## 2021-01-26 PROCEDURE — 43264 ERCP REMOVE DUCT CALCULI: CPT | Performed by: INTERNAL MEDICINE

## 2021-01-26 PROCEDURE — C1769 GUIDE WIRE: HCPCS

## 2021-01-26 PROCEDURE — 43261 ENDO CHOLANGIOPANCREATOGRAPH: CPT | Performed by: INTERNAL MEDICINE

## 2021-01-26 RX ORDER — MIDAZOLAM HYDROCHLORIDE 2 MG/2ML
INJECTION, SOLUTION INTRAMUSCULAR; INTRAVENOUS AS NEEDED
Status: DISCONTINUED | OUTPATIENT
Start: 2021-01-26 | End: 2021-01-26

## 2021-01-26 RX ORDER — SODIUM CHLORIDE 9 MG/ML
INJECTION, SOLUTION INTRAVENOUS CONTINUOUS PRN
Status: DISCONTINUED | OUTPATIENT
Start: 2021-01-26 | End: 2021-01-26

## 2021-01-26 RX ORDER — LIDOCAINE HYDROCHLORIDE 10 MG/ML
0.5 INJECTION, SOLUTION EPIDURAL; INFILTRATION; INTRACAUDAL; PERINEURAL ONCE AS NEEDED
Status: CANCELLED | OUTPATIENT
Start: 2021-01-26

## 2021-01-26 RX ORDER — PROPOFOL 10 MG/ML
INJECTION, EMULSION INTRAVENOUS AS NEEDED
Status: DISCONTINUED | OUTPATIENT
Start: 2021-01-26 | End: 2021-01-26

## 2021-01-26 RX ORDER — ONDANSETRON 2 MG/ML
INJECTION INTRAMUSCULAR; INTRAVENOUS AS NEEDED
Status: DISCONTINUED | OUTPATIENT
Start: 2021-01-26 | End: 2021-01-26

## 2021-01-26 RX ORDER — FENTANYL CITRATE 50 UG/ML
INJECTION, SOLUTION INTRAMUSCULAR; INTRAVENOUS AS NEEDED
Status: DISCONTINUED | OUTPATIENT
Start: 2021-01-26 | End: 2021-01-26

## 2021-01-26 RX ORDER — KETAMINE HYDROCHLORIDE 50 MG/ML
INJECTION, SOLUTION, CONCENTRATE INTRAMUSCULAR; INTRAVENOUS AS NEEDED
Status: DISCONTINUED | OUTPATIENT
Start: 2021-01-26 | End: 2021-01-26

## 2021-01-26 RX ORDER — GLYCOPYRROLATE 0.2 MG/ML
INJECTION INTRAMUSCULAR; INTRAVENOUS AS NEEDED
Status: DISCONTINUED | OUTPATIENT
Start: 2021-01-26 | End: 2021-01-26

## 2021-01-26 RX ORDER — SODIUM CHLORIDE 9 MG/ML
25 INJECTION, SOLUTION INTRAVENOUS CONTINUOUS
Status: CANCELLED | OUTPATIENT
Start: 2021-01-26

## 2021-01-26 RX ADMIN — SODIUM CHLORIDE: 0.9 INJECTION, SOLUTION INTRAVENOUS at 12:50

## 2021-01-26 RX ADMIN — ONDANSETRON 4 MG: 2 INJECTION INTRAMUSCULAR; INTRAVENOUS at 12:54

## 2021-01-26 RX ADMIN — GLYCOPYRROLATE 0.1 MG: 0.2 INJECTION, SOLUTION INTRAMUSCULAR; INTRAVENOUS at 12:54

## 2021-01-26 RX ADMIN — PROPOFOL 20 MG: 10 INJECTION, EMULSION INTRAVENOUS at 13:15

## 2021-01-26 RX ADMIN — MIDAZOLAM 1 MG: 1 INJECTION INTRAMUSCULAR; INTRAVENOUS at 12:50

## 2021-01-26 RX ADMIN — PROPOFOL 50 MG: 10 INJECTION, EMULSION INTRAVENOUS at 13:07

## 2021-01-26 RX ADMIN — PROPOFOL 100 MG: 10 INJECTION, EMULSION INTRAVENOUS at 13:02

## 2021-01-26 RX ADMIN — IOHEXOL 4 ML: 240 INJECTION, SOLUTION INTRATHECAL; INTRAVASCULAR; INTRAVENOUS; ORAL at 04:00

## 2021-01-26 RX ADMIN — PROPOFOL 100 MG: 10 INJECTION, EMULSION INTRAVENOUS at 12:58

## 2021-01-26 RX ADMIN — PROPOFOL 100 MG: 10 INJECTION, EMULSION INTRAVENOUS at 13:00

## 2021-01-26 RX ADMIN — PROPOFOL 20 MG: 10 INJECTION, EMULSION INTRAVENOUS at 13:09

## 2021-01-26 RX ADMIN — FENTANYL CITRATE 25 MCG: 50 INJECTION INTRAMUSCULAR; INTRAVENOUS at 13:00

## 2021-01-26 RX ADMIN — PROPOFOL 20 MG: 10 INJECTION, EMULSION INTRAVENOUS at 13:13

## 2021-01-26 RX ADMIN — PROPOFOL 50 MG: 10 INJECTION, EMULSION INTRAVENOUS at 13:05

## 2021-01-26 RX ADMIN — KETAMINE HYDROCHLORIDE 20 MG: 50 INJECTION, SOLUTION INTRAMUSCULAR; INTRAVENOUS at 12:56

## 2021-01-26 RX ADMIN — PROPOFOL 20 MG: 10 INJECTION, EMULSION INTRAVENOUS at 13:11

## 2021-01-26 NOTE — ANESTHESIA PREPROCEDURE EVALUATION
Procedure:  ERCP    Relevant Problems   CARDIO   (+) Essential hypertension   (+) Rib pain on right side      GI/HEPATIC   (+) Alpha-1-antitrypsin deficiency (HCC)   (+) Ampullary adenoma s/p ampullectomy   (+) Fatty liver      NEURO/PSYCH   (+) Anxiety      PULMONARY   (+) CHINTAN (obstructive sleep apnea) (Suspected)        Physical Exam    Airway    Mallampati score: II  TM Distance: >3 FB  Neck ROM: full     Dental       Cardiovascular      Pulmonary      Other Findings        Anesthesia Plan  ASA Score- 2     Anesthesia Type- IV sedation with anesthesia with ASA Monitors  Additional Monitors:   Airway Plan:           Plan Factors-Exercise tolerance (METS): >4 METS  Exercise comment: Able to climb two flights of stairs without cardiopulmonary limitation  Chart reviewed  EKG reviewed  Existing labs reviewed  Patient summary reviewed  Induction- intravenous  Postoperative Plan-     Informed Consent- Anesthetic plan and risks discussed with patient

## 2021-01-26 NOTE — ANESTHESIA POSTPROCEDURE EVALUATION
Post-Op Assessment Note    CV Status:  Stable  Pain Score: 0    Pain management: adequate     Mental Status:  Arousable   Hydration Status:  Stable   PONV Controlled:  None   Airway Patency:  Patent      Post Op Vitals Reviewed: Yes      Staff: CRNA         No complications documented      /69 (01/26/21 1329)    Temp (!) 96 9 °F (36 1 °C) (01/26/21 1329)    Pulse (!) 110 (01/26/21 1329)   Resp 18 (01/26/21 1329)    SpO2 97 % (01/26/21 1329)

## 2021-01-26 NOTE — H&P
History and Physical - [facility]  Alexia Hooper 46 y o  male MRN: [id number]                  HPI: Alexia Hooper is a 46y o  year old male who presents for ERCP and stents removal after ampullectomy      REVIEW OF SYSTEMS: Per the HPI, and otherwise unremarkable  Historical Information   Past Medical History:   Diagnosis Date    Essential hypertension     Rogers teeth extracted      Past Surgical History:   Procedure Laterality Date    CATARACT EXTRACTION Left     KNEE SURGERY Left      Social History   Social History     Substance and Sexual Activity   Alcohol Use Yes    Frequency: 4 or more times a week    Drinks per session: 5 or 6    Binge frequency: Never    Comment: Social     Social History     Substance and Sexual Activity   Drug Use No     Social History     Tobacco Use   Smoking Status Former Smoker    Types: Cigarettes   Smokeless Tobacco Never Used   Tobacco Comment    Per [employer]scripts: quit 20 yrs ago  Smoked for 7 yrs about a half a pack of cigarettes a day     Family History   Problem Relation Age of Onset    No Known Problems Mother     No Known Problems Father     Colon cancer Family     Diabetes Family         Mellitus    Colon cancer Maternal Grandmother        Meds/Allergies     (Not in a hospital admission)      No Known Allergies    Objective     There were no vitals taken for this visit  PHYSICAL EXAM    Gen: NAD  CV: RRR  CHEST: Clear  ABD: soft, NT/ND  EXT: no edema      ASSESSMENT/PLAN:  This is a 46y o  year old male here for ERCP, the patient is stable and optimized for the procedure, we reviewed risk and benefits   Risk include but not limited to infection, bleeding, perforation and missing a lesion    PLAN:   Procedure: ERCP with stents removal

## 2021-01-29 ENCOUNTER — TELEPHONE (OUTPATIENT)
Dept: GASTROENTEROLOGY | Facility: MEDICAL CENTER | Age: 51
End: 2021-01-29

## 2021-01-29 NOTE — TELEPHONE ENCOUNTER
----- Message from Rubin Cali MD sent at 1/29/2021 12:38 PM EST -----  Please call patient :  Biopsies were normal    Follow up with repeat EGD with side viewing endoscope in 6 - 9 months  Thank you

## 2021-02-24 ENCOUNTER — OFFICE VISIT (OUTPATIENT)
Dept: INTERNAL MEDICINE CLINIC | Facility: CLINIC | Age: 51
End: 2021-02-24
Payer: COMMERCIAL

## 2021-02-24 VITALS
HEIGHT: 69 IN | WEIGHT: 215.6 LBS | DIASTOLIC BLOOD PRESSURE: 72 MMHG | SYSTOLIC BLOOD PRESSURE: 122 MMHG | RESPIRATION RATE: 16 BRPM | BODY MASS INDEX: 31.93 KG/M2 | OXYGEN SATURATION: 97 %

## 2021-02-24 DIAGNOSIS — Z11.4 SCREENING FOR HIV (HUMAN IMMUNODEFICIENCY VIRUS): ICD-10-CM

## 2021-02-24 DIAGNOSIS — R10.11 ABDOMINAL PAIN, CHRONIC, RIGHT UPPER QUADRANT: Primary | ICD-10-CM

## 2021-02-24 DIAGNOSIS — Z12.5 SCREENING PSA (PROSTATE SPECIFIC ANTIGEN): ICD-10-CM

## 2021-02-24 DIAGNOSIS — G89.29 ABDOMINAL PAIN, CHRONIC, RIGHT UPPER QUADRANT: Primary | ICD-10-CM

## 2021-02-24 DIAGNOSIS — I10 ESSENTIAL HYPERTENSION: ICD-10-CM

## 2021-02-24 DIAGNOSIS — Z23 ENCOUNTER FOR IMMUNIZATION: ICD-10-CM

## 2021-02-24 DIAGNOSIS — E78.5 HYPERLIPIDEMIA, UNSPECIFIED HYPERLIPIDEMIA TYPE: ICD-10-CM

## 2021-02-24 PROCEDURE — 90471 IMMUNIZATION ADMIN: CPT

## 2021-02-24 PROCEDURE — 99214 OFFICE O/P EST MOD 30 MIN: CPT | Performed by: INTERNAL MEDICINE

## 2021-02-24 PROCEDURE — 3725F SCREEN DEPRESSION PERFORMED: CPT | Performed by: INTERNAL MEDICINE

## 2021-02-24 PROCEDURE — 90715 TDAP VACCINE 7 YRS/> IM: CPT

## 2021-02-24 PROCEDURE — 3008F BODY MASS INDEX DOCD: CPT | Performed by: INTERNAL MEDICINE

## 2021-02-24 PROCEDURE — 1036F TOBACCO NON-USER: CPT | Performed by: INTERNAL MEDICINE

## 2021-02-24 NOTE — PROGRESS NOTES
Assessment/Plan:    Abdominal pain, chronic, right upper quadrant   Rule out biliary dyskinesia will check a HIDA scan also will check celiac antibodies profile    Hyperlipidemia   Low-cholesterol diet check lipid profile    Screening for HIV (human immunodeficiency virus)   Counseled, check screening  HIV    Screening PSA (prostate specific antigen)   Counseled, check screening PSA    Essential hypertension   Hypertension - controlled, I have counseled patient following healthy balance diet, I would like the patient reduce sodium, exercise routinely, I would like the patient continued the med current medical regiment and we will continue to monitor  Blood pressure 122/72 continue amlodipine 5 mg once daily         Problem List Items Addressed This Visit        Cardiovascular and Mediastinum    Essential hypertension      Hypertension - controlled, I have counseled patient following healthy balance diet, I would like the patient reduce sodium, exercise routinely, I would like the patient continued the med current medical regiment and we will continue to monitor   Blood pressure 122/72 continue amlodipine 5 mg once daily            Other    Screening for HIV (human immunodeficiency virus)      Counseled, check screening  HIV         Relevant Orders    HIV 1/2 Antigen/Antibody (4th Generation) w Reflex [facility]    Encounter for immunization    Relevant Orders    TDAP VACCINE GREATER THAN OR EQUAL TO 6YO IM (Completed)    Abdominal pain, chronic, right upper quadrant - Primary      Rule out biliary dyskinesia will check a HIDA scan also will check celiac antibodies profile         Relevant Orders    Celiac Antibodies Profile    Comprehensive metabolic panel (Completed)    NM hepatobiliary w rx    Hyperlipidemia      Low-cholesterol diet check lipid profile         Relevant Orders    Lipid Panel with Direct LDL reflex (Completed)    Screening PSA (prostate specific antigen)      Counseled, check screening PSA Relevant Orders    PSA, Total Screen           return to office 3  months  call if any problems  Subjective:      Patient ID: Helio Thibodeaux is a 46 y o  male  HPI  46-year old male coming in for a follow up visit regarding chronic right upper quadrant abdominal pain, hyperlipidemia and hypertension; The patient reports me compliant taking medications without untoward side effects the  The patient is here to review his medical condition, update me on the medical condition and the patient reports me no hospitalizations and no ER visits  dull aching pain 10 yrs working with gi , foods worsen the sx , will try gluten free pt to f/u with Dr Sally Martin  Overall is doing well no injuries/no illnesses trying to follow healthy diet  The following portions of the patient's history were reviewed and updated as appropriate: allergies, current medications, past family history, past medical history, past social history, past surgical history and problem list     Review of Systems   Constitutional: Negative for activity change, appetite change and unexpected weight change  HENT: Negative for congestion and postnasal drip  Respiratory: Negative for cough and shortness of breath  Cardiovascular: Negative for chest pain  Gastrointestinal: Positive for abdominal pain ( mild right upper quadrant tenderness)  Negative for diarrhea, nausea and vomiting  Neurological: Negative for dizziness, light-headedness and headaches  Objective:    No follow-ups on file  No results found        No Known Allergies    Past Medical History:   Diagnosis Date    Colon polyp     Essential hypertension     [place] teeth extracted      Past Surgical History:   Procedure Laterality Date    CATARACT EXTRACTION Left     COLONOSCOPY      HAND SURGERY      KNEE SURGERY Left      Current Outpatient Medications on File Prior to Visit   Medication Sig Dispense Refill    amLODIPine (NORVASC) 5 mg tablet Take 1 tablet (5 mg total) by mouth daily 30 tablet 5    Ascorbic Acid (VITAMIN C) 500 MG CAPS Take 1 capsule by mouth daily      hydrocortisone valerate (WEST-GA) 0 2 % ointment As needed prefers the cream      multivitamin (THERAGRAN) TABS Take 1 tablet by mouth daily      pantoprazole (PROTONIX) 40 mg tablet TAKE 1 TABLET BY MOUTH EVERY DAY 30 tablet 3     No current facility-administered medications on file prior to visit  Family History   Problem Relation Age of Onset    No Known Problems Mother     No Known Problems Father     Colon cancer Family     Diabetes Family         Mellitus    Colon cancer Maternal Grandmother      Social History     Socioeconomic History    Marital status: /Civil Union     Spouse name: Not on file    Number of children: Not on file    Years of education: Not on file    Highest education level: Not on file   Occupational History    Not on file   Social Needs    Financial resource strain: Not on file    Food insecurity     Worry: Not on file     Inability: Not on file   [employer] needs     Medical: Not on file     Non-medical: Not on file   Tobacco Use    Smoking status: Former Smoker     Types: Cigarettes    Smokeless tobacco: Never Used    Tobacco comment: Per Allscripts: quit 20 yrs ago   Smoked for 7 yrs about a half a pack of cigarettes a day   Substance and Sexual Activity    Alcohol use: Yes     Frequency: 4 or more times a week     Drinks per session: 5 or 6     Binge frequency: Never     Comment: Social    Drug use: No    Sexual activity: Not on file   Lifestyle    Physical activity     Days per week: Not on file     Minutes per session: Not on file    Stress: Not on file   Relationships    Social connections     Talks on phone: Not on file     Gets together: Not on file     Attends [employer] service: Not on file     Active member of club or organization: Not on file     Attends meetings of clubs or organizations: Not on file     Relationship status: Not on file    Intimate partner violence     Fear of current or ex partner: Not on file     Emotionally abused: Not on file     Physically abused: Not on file     Forced sexual activity: Not on file   Other Topics Concern    Not on file   Social History Narrative    Not on file     Vitals:    02/24/21 1325   BP: 122/72   Resp: 16   SpO2: 97%   Weight: 97 8 kg (215 lb 9 6 oz)   Height: 5' 9" (1 753 m)     Results for orders placed or performed during the hospital encounter of 01/26/21   Tissue Exam   Result Value Ref Range    Case Report       Surgical Pathology Report                         Case: [id number]                                   Authorizing Provider:  Shantanu Edmonds MD  Collected:           01/26/2021 1318              Ordering Location:     [address] [place] [address]      Received:            01/26/2021 [address] [facility]                                                           Pathologist:           Ernst Pugh MD                                                         Specimen:    Duodenum, joe ampullary mucosa                                                            Final Diagnosis       A  Duodenum joe ampullary mucosa:  - Benign duodenal mucosa with regenerative villous enterocyte hyperplasia  - No active or chronic duodenitis  - No epithelial dysplasia and no evidence of malignancy at 16 separate levels of the block  Additional Information       All reported additional testing was performed with appropriately reactive controls  These tests were developed and their performance characteristics determined by [facility] or appropriate performing facility, though some tests may be performed on tissues which have not been validated for performance characteristics (such as staining performed on alcohol exposed cell blocks and decalcified tissues)    Results should be interpreted with caution and in the context of the patients clinical condition  These tests may not be cleared or approved by the [place]  Food and Drug Administration, though the FDA has determined that such clearance or approval is not necessary  These tests are used for clinical purposes and they should not be regarded as investigational or for research  This laboratory has been approved by CLIA 88, designated as a high-complexity laboratory and is qualified to perform these tests  Juan Ramos Description          A  The specimen is received in formalin, labeled with the patient's name and hospital number, and is designated " duodenum joe ampullary mucosa   The specimen consists of 2 tan-pink flat and elongated soft tissue fragments measuring 0 2-0 5 cm in greatest dimension  The specimen is entirely submitted in a screened cassette  Note: The estimated total formalin fixation time based upon information provided by the submitting clinician and the standard processing schedule is under 72 hours  VAlvarez              Weight (last 2 days)     None        Body mass index is 31 84 kg/m²  BP      Temp      Pulse     Resp      SpO2        Vitals:    02/24/21 1325   Weight: 97 8 kg (215 lb 9 6 oz)     Vitals:    02/24/21 1325   Weight: 97 8 kg (215 lb 9 6 oz)       /72   Resp 16   Ht 5' 9" (1 753 m)   Wt 97 8 kg (215 lb 9 6 oz)   SpO2 97%   BMI 31 84 kg/m²          Physical Exam  Constitutional:       General: He is not in acute distress  Appearance: He is well-developed  He is not diaphoretic  HENT:      Head: Normocephalic and atraumatic  Right Ear: External ear normal       Left Ear: External ear normal    Eyes:      General: No scleral icterus  Right eye: No discharge  Left eye: No discharge  Conjunctiva/sclera: Conjunctivae normal       Pupils: Pupils are equal, round, and reactive to light  Neck:      Musculoskeletal: Neck supple  Cardiovascular:      Rate and Rhythm: Normal rate and regular rhythm        Heart sounds: Normal heart sounds  No murmur  No friction rub  No gallop  Pulmonary:      Effort: No respiratory distress  Breath sounds: No wheezing or rales  Abdominal:      General: Bowel sounds are normal  There is no distension  Palpations: Abdomen is soft  There is no mass  Tenderness: There is no abdominal tenderness  There is no guarding or rebound  Musculoskeletal:         General: No deformity  Lymphadenopathy:      Cervical: No cervical adenopathy  Neurological:      Mental Status: He is alert          Mild right upper quadrant tenderness

## 2021-02-25 ENCOUNTER — TRANSCRIBE ORDERS (OUTPATIENT)
Dept: ADMINISTRATIVE | Facility: HOSPITAL | Age: 51
End: 2021-02-25

## 2021-02-27 ENCOUNTER — LAB (OUTPATIENT)
Dept: LAB | Facility: MEDICAL CENTER | Age: 51
End: 2021-02-27
Payer: COMMERCIAL

## 2021-02-27 DIAGNOSIS — E78.5 HYPERLIPIDEMIA, UNSPECIFIED HYPERLIPIDEMIA TYPE: ICD-10-CM

## 2021-02-27 DIAGNOSIS — R10.11 ABDOMINAL PAIN, CHRONIC, RIGHT UPPER QUADRANT: ICD-10-CM

## 2021-02-27 DIAGNOSIS — G89.29 ABDOMINAL PAIN, CHRONIC, RIGHT UPPER QUADRANT: ICD-10-CM

## 2021-02-27 DIAGNOSIS — Z11.4 SCREENING FOR HIV (HUMAN IMMUNODEFICIENCY VIRUS): ICD-10-CM

## 2021-02-27 LAB
ALBUMIN SERPL BCP-MCNC: 4.2 G/DL (ref 3.5–5)
ALP SERPL-CCNC: 55 U/L (ref 46–116)
ALT SERPL W P-5'-P-CCNC: 138 U/L (ref 12–78)
ANION GAP SERPL CALCULATED.3IONS-SCNC: 3 MMOL/L (ref 4–13)
AST SERPL W P-5'-P-CCNC: 60 U/L (ref 5–45)
BILIRUB SERPL-MCNC: 0.77 MG/DL (ref 0.2–1)
BUN SERPL-MCNC: 10 MG/DL (ref 5–25)
CALCIUM SERPL-MCNC: 8.8 MG/DL (ref 8.3–10.1)
CHLORIDE SERPL-SCNC: 106 MMOL/L (ref 100–108)
CHOLEST SERPL-MCNC: 158 MG/DL (ref 50–200)
CO2 SERPL-SCNC: 30 MMOL/L (ref 21–32)
CREAT SERPL-MCNC: 1.02 MG/DL (ref 0.6–1.3)
GFR SERPL CREATININE-BSD FRML MDRD: 85 ML/MIN/1.73SQ M
GLUCOSE P FAST SERPL-MCNC: 107 MG/DL (ref 65–99)
HDLC SERPL-MCNC: 45 MG/DL
LDLC SERPL CALC-MCNC: 92 MG/DL (ref 0–100)
POTASSIUM SERPL-SCNC: 4.5 MMOL/L (ref 3.5–5.3)
PROT SERPL-MCNC: 7.4 G/DL (ref 6.4–8.2)
SODIUM SERPL-SCNC: 139 MMOL/L (ref 136–145)
TRIGL SERPL-MCNC: 103 MG/DL

## 2021-02-27 PROCEDURE — 86255 FLUORESCENT ANTIBODY SCREEN: CPT

## 2021-02-27 PROCEDURE — 87389 HIV-1 AG W/HIV-1&-2 AB AG IA: CPT

## 2021-02-27 PROCEDURE — 82784 ASSAY IGA/IGD/IGG/IGM EACH: CPT

## 2021-02-27 PROCEDURE — 83516 IMMUNOASSAY NONANTIBODY: CPT

## 2021-02-27 PROCEDURE — 80053 COMPREHEN METABOLIC PANEL: CPT

## 2021-02-27 PROCEDURE — 36415 COLL VENOUS BLD VENIPUNCTURE: CPT

## 2021-02-27 PROCEDURE — 80061 LIPID PANEL: CPT

## 2021-02-28 NOTE — ASSESSMENT & PLAN NOTE
Hypertension - controlled, I have counseled patient following healthy balance diet, I would like the patient reduce sodium, exercise routinely, I would like the patient continued the med current medical regiment and we will continue to monitor   Blood pressure 122/72 continue amlodipine 5 mg once daily

## 2021-03-01 DIAGNOSIS — R74.01 ELEVATED AST (SGOT): ICD-10-CM

## 2021-03-01 DIAGNOSIS — R89.9 ABNORMAL LABORATORY TEST: Primary | ICD-10-CM

## 2021-03-01 LAB
ENDOMYSIUM IGA SER QL: NEGATIVE
GLIADIN PEPTIDE IGA SER-ACNC: 5 UNITS (ref 0–19)
GLIADIN PEPTIDE IGG SER-ACNC: 4 UNITS (ref 0–19)
HIV 1+2 AB+HIV1 P24 AG SERPL QL IA: NORMAL
IGA SERPL-MCNC: 257 MG/DL (ref 90–386)
TTG IGA SER-ACNC: <2 U/ML (ref 0–3)
TTG IGG SER-ACNC: 8 U/ML (ref 0–5)

## 2021-03-03 ENCOUNTER — TELEPHONE (OUTPATIENT)
Dept: INTERNAL MEDICINE CLINIC | Facility: CLINIC | Age: 51
End: 2021-03-03

## 2021-03-03 NOTE — TELEPHONE ENCOUNTER
Pt's insurance denied the request for the [facility] hepatobiliary w rx  It was denied for the following reasons:    Based on [employer] Abdomen Imaging Guidelines Section(s): AB 2 3 Right Upper Quadrant Pain including Suspected Gallbladder Disease , we cannot approve this request  Your records show that you have belly (abdomen) pain  The reason this request cannot be approved is because:   HIDA with or without pharmacologic intervention (JCS®61471 or BMS®10819) can be approved for the evaluation of any of the following  -Suspected gallbladder disease with right upper quadrant pain or epigastric pain and after a normal or equivocal or non-diagnostic recent abdominal US  -Suspected bile leak after trauma or surgery  -The monitoring of liver regeneration  -Liver transplant candidacy  -Suspected or known choledochal cyst  -Pre-operative assessment prior to partial hepatectomy  -Chronic acalculous cholecystitis, biliary dyskinesia, functional gallbladder disease, or sphincter of Oddi dysfunction  The clinical information provided does not describe these criteria and, therefore, the request is not indicated at this time       A peer to peer can be done by [phone]    Pt is scheduled for this test on 3/4 @ 10:30 am      Please advise

## 2021-03-05 ENCOUNTER — TRANSCRIBE ORDERS (OUTPATIENT)
Dept: ADMINISTRATIVE | Facility: HOSPITAL | Age: 51
End: 2021-03-05

## 2021-03-05 DIAGNOSIS — R10.9 ABDOMINAL PAIN, UNSPECIFIED ABDOMINAL LOCATION: Primary | ICD-10-CM

## 2021-03-17 ENCOUNTER — HOSPITAL ENCOUNTER (OUTPATIENT)
Dept: NUCLEAR MEDICINE | Facility: HOSPITAL | Age: 51
Discharge: HOME | End: 2021-03-17
Payer: COMMERCIAL

## 2021-03-17 DIAGNOSIS — R10.11 ABDOMINAL PAIN, CHRONIC, RIGHT UPPER QUADRANT: ICD-10-CM

## 2021-03-17 DIAGNOSIS — G89.29 ABDOMINAL PAIN, CHRONIC, RIGHT UPPER QUADRANT: ICD-10-CM

## 2021-03-17 PROCEDURE — G1004 CDSM NDSC: HCPCS

## 2021-03-17 PROCEDURE — A9537 TC99M MEBROFENIN: HCPCS

## 2021-03-17 PROCEDURE — 78227 HEPATOBIL SYST IMAGE W/DRUG: CPT

## 2021-03-17 RX ADMIN — SINCALIDE 2 MCG: 5 INJECTION, POWDER, LYOPHILIZED, FOR SOLUTION INTRAVENOUS at 08:55

## 2021-03-30 DIAGNOSIS — Z23 ENCOUNTER FOR IMMUNIZATION: ICD-10-CM

## 2021-04-01 ENCOUNTER — IMMUNIZATIONS (OUTPATIENT)
Dept: FAMILY MEDICINE CLINIC | Facility: HOSPITAL | Age: 51
End: 2021-04-01

## 2021-04-01 DIAGNOSIS — Z23 ENCOUNTER FOR IMMUNIZATION: Primary | ICD-10-CM

## 2021-04-01 PROCEDURE — 0001A SARS-COV-2 / COVID-19 MRNA VACCINE (PFIZER-BIONTECH) 30 MCG: CPT

## 2021-04-01 PROCEDURE — 91300 SARS-COV-2 / COVID-19 MRNA VACCINE (PFIZER-BIONTECH) 30 MCG: CPT

## 2021-04-22 ENCOUNTER — IMMUNIZATIONS (OUTPATIENT)
Dept: FAMILY MEDICINE CLINIC | Facility: HOSPITAL | Age: 51
End: 2021-04-22

## 2021-04-22 DIAGNOSIS — Z23 ENCOUNTER FOR IMMUNIZATION: Primary | ICD-10-CM

## 2021-04-22 PROCEDURE — 91300 SARS-COV-2 / COVID-19 MRNA VACCINE (PFIZER-BIONTECH) 30 MCG: CPT

## 2021-04-22 PROCEDURE — 0002A SARS-COV-2 / COVID-19 MRNA VACCINE (PFIZER-BIONTECH) 30 MCG: CPT

## 2021-04-23 DIAGNOSIS — I10 ESSENTIAL HYPERTENSION: ICD-10-CM

## 2021-04-23 RX ORDER — AMLODIPINE BESYLATE 5 MG/1
TABLET ORAL
Qty: 30 TABLET | Refills: 0 | Status: SHIPPED | OUTPATIENT
Start: 2021-04-23 | End: 2021-05-28

## 2021-05-18 ENCOUNTER — RA CDI HCC (OUTPATIENT)
Dept: OTHER | Facility: HOSPITAL | Age: 51
End: 2021-05-18

## 2021-05-18 NOTE — PROGRESS NOTES
[facility]  coding opportunities             Chart reviewed, (number of) suggestions sent to provider: 1           Patients insurance company: [employer] (Medicare Advantage and AOL)     Visit status: Patient canceled the appointment        Re: Angela Moses    Based on clinical documentation indicated in the medical record, it appears that the patient may have the following condition:    E88 01 - Alpha-1-antitrypsin deficiency    If this is correct, please document, assess, and code at your next visit on 5/25/2021    [place] [address]  coding opportunities             Chart reviewed, (number of) suggestions sent to provider: 1           Patients insurance company: [employer] (Medicare Advantage and AOL)

## 2021-05-19 ENCOUNTER — TELEPHONE (OUTPATIENT)
Dept: GASTROENTEROLOGY | Facility: CLINIC | Age: 51
End: 2021-05-19

## 2021-05-19 NOTE — TELEPHONE ENCOUNTER
Patients GI provider:  Dr Rebekah Jordan    Number to return call: [phone]    Reason for call: Pt wife called to schedule repeat EGD      Scheduled procedure/appointment date if applicable: N/A

## 2021-05-20 NOTE — TELEPHONE ENCOUNTER
Left message for patient to return my call to schedule his repeat EGD per Dr Emery Trimble order   Office back line number was given  for patient or his wife to reach the office directly to schedule

## 2021-05-27 DIAGNOSIS — I10 ESSENTIAL HYPERTENSION: ICD-10-CM

## 2021-05-28 RX ORDER — AMLODIPINE BESYLATE 5 MG/1
TABLET ORAL
Qty: 30 TABLET | Refills: 0 | Status: SHIPPED | OUTPATIENT
Start: 2021-05-28 | End: 2021-07-07

## 2021-07-07 DIAGNOSIS — I10 ESSENTIAL HYPERTENSION: ICD-10-CM

## 2021-07-07 RX ORDER — AMLODIPINE BESYLATE 5 MG/1
TABLET ORAL
Qty: 30 TABLET | Refills: 0 | Status: SHIPPED | OUTPATIENT
Start: 2021-07-07 | End: 2021-08-19

## 2021-07-26 ENCOUNTER — TELEPHONE (OUTPATIENT)
Dept: GASTROENTEROLOGY | Facility: HOSPITAL | Age: 51
End: 2021-07-26

## 2021-07-27 ENCOUNTER — ANESTHESIA (OUTPATIENT)
Dept: GASTROENTEROLOGY | Facility: HOSPITAL | Age: 51
End: 2021-07-27

## 2021-07-27 ENCOUNTER — ANESTHESIA EVENT (OUTPATIENT)
Dept: GASTROENTEROLOGY | Facility: HOSPITAL | Age: 51
End: 2021-07-27

## 2021-07-27 ENCOUNTER — HOSPITAL ENCOUNTER (OUTPATIENT)
Dept: GASTROENTEROLOGY | Facility: HOSPITAL | Age: 51
Setting detail: OUTPATIENT SURGERY
Discharge: HOME | End: 2021-07-27
Attending: INTERNAL MEDICINE
Payer: COMMERCIAL

## 2021-07-27 VITALS
BODY MASS INDEX: 31.1 KG/M2 | OXYGEN SATURATION: 94 % | HEIGHT: 69 IN | TEMPERATURE: 97.2 F | DIASTOLIC BLOOD PRESSURE: 68 MMHG | RESPIRATION RATE: 18 BRPM | SYSTOLIC BLOOD PRESSURE: 121 MMHG | HEART RATE: 95 BPM | WEIGHT: 210 LBS

## 2021-07-27 DIAGNOSIS — D13.5 AMPULLARY ADENOMA: ICD-10-CM

## 2021-07-27 PROCEDURE — 88305 TISSUE EXAM BY PATHOLOGIST: CPT | Performed by: PATHOLOGY

## 2021-07-27 PROCEDURE — 43239 EGD BIOPSY SINGLE/MULTIPLE: CPT | Performed by: INTERNAL MEDICINE

## 2021-07-27 RX ORDER — PROPOFOL 10 MG/ML
INJECTION, EMULSION INTRAVENOUS AS NEEDED
Status: DISCONTINUED | OUTPATIENT
Start: 2021-07-27 | End: 2021-07-27

## 2021-07-27 RX ORDER — PROPOFOL 10 MG/ML
INJECTION, EMULSION INTRAVENOUS CONTINUOUS PRN
Status: DISCONTINUED | OUTPATIENT
Start: 2021-07-27 | End: 2021-07-27

## 2021-07-27 RX ORDER — SODIUM CHLORIDE 9 MG/ML
INJECTION, SOLUTION INTRAVENOUS CONTINUOUS PRN
Status: DISCONTINUED | OUTPATIENT
Start: 2021-07-27 | End: 2021-07-27

## 2021-07-27 RX ADMIN — PROPOFOL 50 MG: 10 INJECTION, EMULSION INTRAVENOUS at 07:49

## 2021-07-27 RX ADMIN — PROPOFOL 100 MG: 10 INJECTION, EMULSION INTRAVENOUS at 07:47

## 2021-07-27 RX ADMIN — PROPOFOL 120 MCG/KG/MIN: 10 INJECTION, EMULSION INTRAVENOUS at 07:47

## 2021-07-27 RX ADMIN — SODIUM CHLORIDE: 9 INJECTION, SOLUTION INTRAVENOUS at 07:40

## 2021-07-27 RX ADMIN — TOPICAL ANESTHETIC 1 SPRAY: 200 SPRAY DENTAL; PERIODONTAL at 07:37

## 2021-07-27 NOTE — ANESTHESIA PREPROCEDURE EVALUATION
Procedure:  EGD    Relevant Problems   CARDIO   (+) Essential hypertension   (+) Hyperlipidemia   (+) Rib pain on right side      GI/HEPATIC   (+) Alpha-1-antitrypsin deficiency (HCC)   (+) Ampullary adenoma s/p ampullectomy   (+) Fatty liver      NEURO/PSYCH   (+) Anxiety      PULMONARY   (+) CHINTAN (obstructive sleep apnea)      NPO since 7/26/21  Physical Exam    Airway    Mallampati score: II  TM Distance: >3 FB  Neck ROM: full     Dental   No notable dental hx     Cardiovascular  Rhythm: regular, Rate: normal, Cardiovascular exam normal    Pulmonary  Pulmonary exam normal     Other Findings        Anesthesia Plan  ASA Score- 2     Anesthesia Type- IV sedation with anesthesia with ASA Monitors  Additional Monitors:   Airway Plan:           Plan Factors-Exercise tolerance (METS): >4 METS  Chart reviewed  Existing labs reviewed  Patient summary reviewed  Obstructive sleep apnea risk education given perioperatively  Induction- intravenous  Postoperative Plan-     Informed Consent- Anesthetic plan and risks discussed with patient and spouse  I personally reviewed this patient with the CRNA  Discussed and agreed on the Anesthesia Plan with the CRNA  Tashi Farmer

## 2021-07-27 NOTE — H&P
History and Physical -  [facility]  Pedro Perdomo 46 y o  male MRN: [id number]    HPI: Pedro Perdomo is a 46y o  year old male who presents with ampullary adenoma s/p ampullectomy in 12/20  Follow up in 1/21 showed no adenomatous tissue  Now for follow up  Review of Systems    Historical Information   Past Medical History:   Diagnosis Date    Colon polyp     Essential hypertension     Hypertension     [place] teeth extracted      Past Surgical History:   Procedure Laterality Date    CATARACT EXTRACTION Left     COLONOSCOPY      HAND SURGERY      KNEE SURGERY Left      Social History   Social History     Substance and Sexual Activity   Alcohol Use Yes    Comment: Social     Social History     Substance and Sexual Activity   Drug Use No     Social History     Tobacco Use   Smoking Status Former Smoker    Types: Cigarettes   Smokeless Tobacco Never Used   Tobacco Comment    Per Allscripts: quit 20 yrs ago  Smoked for 7 yrs about a half a pack of cigarettes a day     Family History   Problem Relation Age of Onset    No Known Problems Mother     No Known Problems Father     Colon cancer Family     Diabetes Family         Mellitus    Colon cancer Maternal Grandmother        Meds/Allergies     (Not in a hospital admission)      No Known Allergies    Objective     /85   Pulse 80   Temp (!) 96 8 °F (36 °C) (Tympanic)   Resp 16   Ht 5' 9" (1 753 m)   Wt 95 3 kg (210 lb)   SpO2 96%   BMI 31 01 kg/m²       PHYSICAL EXAM    Gen: NAD  CV: RRR  CHEST: Clear  ABD: soft, NT/ND  EXT: no edema  Neuro: AAO      ASSESSMENT/PLAN:  This is a 46y o  year old male here for endoscopy for follow up of ampullary adenoma  PLAN:   Procedure: endoscopy

## 2021-08-14 ENCOUNTER — APPOINTMENT (OUTPATIENT)
Dept: LAB | Facility: MEDICAL CENTER | Age: 51
End: 2021-08-14

## 2021-08-14 DIAGNOSIS — Z00.8 HEALTH EXAMINATION IN POPULATION SURVEY: ICD-10-CM

## 2021-08-14 LAB
CHOLEST SERPL-MCNC: 177 MG/DL (ref 50–200)
EST. AVERAGE GLUCOSE BLD GHB EST-MCNC: 111 MG/DL
HBA1C MFR BLD: 5.5 %
HDLC SERPL-MCNC: 38 MG/DL
LDLC SERPL CALC-MCNC: 70 MG/DL (ref 0–100)
NONHDLC SERPL-MCNC: 139 MG/DL
TRIGL SERPL-MCNC: 344 MG/DL

## 2021-08-14 PROCEDURE — 83036 HEMOGLOBIN GLYCOSYLATED A1C: CPT

## 2021-08-14 PROCEDURE — 36415 COLL VENOUS BLD VENIPUNCTURE: CPT

## 2021-08-14 PROCEDURE — 80061 LIPID PANEL: CPT

## 2021-08-19 DIAGNOSIS — I10 ESSENTIAL HYPERTENSION: ICD-10-CM

## 2021-08-19 RX ORDER — AMLODIPINE BESYLATE 5 MG/1
TABLET ORAL
Qty: 30 TABLET | Refills: 0 | Status: SHIPPED | OUTPATIENT
Start: 2021-08-19 | End: 2021-09-25

## 2021-09-25 DIAGNOSIS — I10 ESSENTIAL HYPERTENSION: ICD-10-CM

## 2021-09-25 RX ORDER — AMLODIPINE BESYLATE 5 MG/1
TABLET ORAL
Qty: 30 TABLET | Refills: 0 | Status: SHIPPED | OUTPATIENT
Start: 2021-09-25 | End: 2021-11-19

## 2021-11-19 DIAGNOSIS — I10 ESSENTIAL HYPERTENSION: ICD-10-CM

## 2021-11-19 RX ORDER — AMLODIPINE BESYLATE 5 MG/1
TABLET ORAL
Qty: 30 TABLET | Refills: 0 | Status: SHIPPED | OUTPATIENT
Start: 2021-11-19 | End: 2021-12-16 | Stop reason: SDUPTHER

## 2021-11-22 ENCOUNTER — CLINICAL SUPPORT (OUTPATIENT)
Dept: INTERNAL MEDICINE CLINIC | Facility: CLINIC | Age: 51
End: 2021-11-22
Payer: COMMERCIAL

## 2021-11-22 DIAGNOSIS — R10.11 RIGHT UPPER QUADRANT ABDOMINAL PAIN: ICD-10-CM

## 2021-11-22 DIAGNOSIS — Z23 NEED FOR VACCINATION: Primary | ICD-10-CM

## 2021-11-22 PROCEDURE — 90471 IMMUNIZATION ADMIN: CPT

## 2021-11-22 PROCEDURE — 90682 RIV4 VACC RECOMBINANT DNA IM: CPT

## 2021-11-22 RX ORDER — PANTOPRAZOLE SODIUM 40 MG/1
TABLET, DELAYED RELEASE ORAL
Qty: 30 TABLET | Refills: 3 | Status: SHIPPED | OUTPATIENT
Start: 2021-11-22

## 2021-12-10 ENCOUNTER — IMMUNIZATIONS (OUTPATIENT)
Dept: FAMILY MEDICINE CLINIC | Facility: HOSPITAL | Age: 51
End: 2021-12-10

## 2021-12-10 DIAGNOSIS — Z23 ENCOUNTER FOR IMMUNIZATION: Primary | ICD-10-CM

## 2021-12-10 PROCEDURE — 0001A COVID-19 PFIZER VACC 0.3 ML: CPT

## 2021-12-10 PROCEDURE — 91300 COVID-19 PFIZER VACC 0.3 ML: CPT

## 2021-12-16 ENCOUNTER — OFFICE VISIT (OUTPATIENT)
Dept: INTERNAL MEDICINE CLINIC | Facility: CLINIC | Age: 51
End: 2021-12-16
Payer: COMMERCIAL

## 2021-12-16 VITALS
HEART RATE: 75 BPM | HEIGHT: 69 IN | SYSTOLIC BLOOD PRESSURE: 128 MMHG | OXYGEN SATURATION: 96 % | BODY MASS INDEX: 29.95 KG/M2 | RESPIRATION RATE: 16 BRPM | DIASTOLIC BLOOD PRESSURE: 82 MMHG | WEIGHT: 202.2 LBS

## 2021-12-16 DIAGNOSIS — E78.1 HYPERTRIGLYCERIDEMIA: Primary | ICD-10-CM

## 2021-12-16 DIAGNOSIS — Z23 NEED FOR VACCINATION: ICD-10-CM

## 2021-12-16 DIAGNOSIS — I10 ESSENTIAL HYPERTENSION: ICD-10-CM

## 2021-12-16 DIAGNOSIS — Z12.5 SCREENING FOR PROSTATE CANCER: ICD-10-CM

## 2021-12-16 DIAGNOSIS — R10.10 PAIN OF UPPER ABDOMEN: ICD-10-CM

## 2021-12-16 DIAGNOSIS — Z23 ENCOUNTER FOR IMMUNIZATION: ICD-10-CM

## 2021-12-16 DIAGNOSIS — F10.10 ALCOHOL ABUSE: ICD-10-CM

## 2021-12-16 PROCEDURE — 3008F BODY MASS INDEX DOCD: CPT | Performed by: INTERNAL MEDICINE

## 2021-12-16 PROCEDURE — 99214 OFFICE O/P EST MOD 30 MIN: CPT | Performed by: INTERNAL MEDICINE

## 2021-12-16 PROCEDURE — 3079F DIAST BP 80-89 MM HG: CPT | Performed by: INTERNAL MEDICINE

## 2021-12-16 PROCEDURE — 3074F SYST BP LT 130 MM HG: CPT | Performed by: INTERNAL MEDICINE

## 2021-12-16 PROCEDURE — 1036F TOBACCO NON-USER: CPT | Performed by: INTERNAL MEDICINE

## 2021-12-16 RX ORDER — AMLODIPINE BESYLATE 5 MG/1
5 TABLET ORAL DAILY
Qty: 90 TABLET | Refills: 1 | Status: SHIPPED | OUTPATIENT
Start: 2021-12-16

## 2022-01-19 ENCOUNTER — CLINICAL SUPPORT (OUTPATIENT)
Dept: INTERNAL MEDICINE CLINIC | Facility: CLINIC | Age: 52
End: 2022-01-19
Payer: COMMERCIAL

## 2022-01-19 DIAGNOSIS — Z23 NEED FOR VACCINATION: Primary | ICD-10-CM

## 2022-01-19 PROCEDURE — 90471 IMMUNIZATION ADMIN: CPT

## 2022-01-19 PROCEDURE — 90750 HZV VACC RECOMBINANT IM: CPT

## 2022-01-22 ENCOUNTER — APPOINTMENT (OUTPATIENT)
Dept: LAB | Facility: MEDICAL CENTER | Age: 52
End: 2022-01-22
Payer: COMMERCIAL

## 2022-01-22 DIAGNOSIS — F10.10 ALCOHOL ABUSE: ICD-10-CM

## 2022-01-22 DIAGNOSIS — R89.9 ABNORMAL LABORATORY TEST: ICD-10-CM

## 2022-01-22 DIAGNOSIS — Z12.5 SCREENING PSA (PROSTATE SPECIFIC ANTIGEN): ICD-10-CM

## 2022-01-22 DIAGNOSIS — R10.10 PAIN OF UPPER ABDOMEN: ICD-10-CM

## 2022-01-22 DIAGNOSIS — Z12.5 SCREENING FOR PROSTATE CANCER: ICD-10-CM

## 2022-01-22 DIAGNOSIS — E78.1 HYPERTRIGLYCERIDEMIA: ICD-10-CM

## 2022-01-22 LAB
ALBUMIN SERPL BCP-MCNC: 4.2 G/DL (ref 3.5–5)
ALP SERPL-CCNC: 51 U/L (ref 46–116)
ALT SERPL W P-5'-P-CCNC: 64 U/L (ref 12–78)
ANION GAP SERPL CALCULATED.3IONS-SCNC: 3 MMOL/L (ref 4–13)
AST SERPL W P-5'-P-CCNC: 32 U/L (ref 5–45)
BASOPHILS # BLD AUTO: 0.06 THOUSANDS/ΜL (ref 0–0.1)
BASOPHILS NFR BLD AUTO: 1 % (ref 0–1)
BILIRUB SERPL-MCNC: 0.92 MG/DL (ref 0.2–1)
BUN SERPL-MCNC: 14 MG/DL (ref 5–25)
CALCIUM SERPL-MCNC: 9.1 MG/DL (ref 8.3–10.1)
CHLORIDE SERPL-SCNC: 102 MMOL/L (ref 100–108)
CHOLEST SERPL-MCNC: 159 MG/DL
CO2 SERPL-SCNC: 31 MMOL/L (ref 21–32)
CREAT SERPL-MCNC: 1.02 MG/DL (ref 0.6–1.3)
EOSINOPHIL # BLD AUTO: 0.15 THOUSAND/ΜL (ref 0–0.61)
EOSINOPHIL NFR BLD AUTO: 3 % (ref 0–6)
ERYTHROCYTE [DISTWIDTH] IN BLOOD BY AUTOMATED COUNT: 11.9 % (ref 11.6–15.1)
GFR SERPL CREATININE-BSD FRML MDRD: 84 ML/MIN/1.73SQ M
GLUCOSE P FAST SERPL-MCNC: 115 MG/DL (ref 65–99)
HCT VFR BLD AUTO: 50.2 % (ref 36.5–49.3)
HDLC SERPL-MCNC: 45 MG/DL
HGB BLD-MCNC: 16.6 G/DL (ref 12–17)
IMM GRANULOCYTES # BLD AUTO: 0.03 THOUSAND/UL (ref 0–0.2)
IMM GRANULOCYTES NFR BLD AUTO: 1 % (ref 0–2)
LDLC SERPL CALC-MCNC: 101 MG/DL (ref 0–100)
LYMPHOCYTES # BLD AUTO: 1.63 THOUSANDS/ΜL (ref 0.6–4.47)
LYMPHOCYTES NFR BLD AUTO: 30 % (ref 14–44)
MCH RBC QN AUTO: 31.6 PG (ref 26.8–34.3)
MCHC RBC AUTO-ENTMCNC: 33.1 G/DL (ref 31.4–37.4)
MCV RBC AUTO: 96 FL (ref 82–98)
MONOCYTES # BLD AUTO: 0.78 THOUSAND/ΜL (ref 0.17–1.22)
MONOCYTES NFR BLD AUTO: 14 % (ref 4–12)
NEUTROPHILS # BLD AUTO: 2.78 THOUSANDS/ΜL (ref 1.85–7.62)
NEUTS SEG NFR BLD AUTO: 51 % (ref 43–75)
NRBC BLD AUTO-RTO: 0 /100 WBCS
PLATELET # BLD AUTO: 224 THOUSANDS/UL (ref 149–390)
PMV BLD AUTO: 9.9 FL (ref 8.9–12.7)
POTASSIUM SERPL-SCNC: 4.4 MMOL/L (ref 3.5–5.3)
PROT SERPL-MCNC: 7.7 G/DL (ref 6.4–8.2)
PSA SERPL-MCNC: 1 NG/ML (ref 0–4)
PSA SERPL-MCNC: 1.1 NG/ML (ref 0–4)
RBC # BLD AUTO: 5.25 MILLION/UL (ref 3.88–5.62)
SODIUM SERPL-SCNC: 136 MMOL/L (ref 136–145)
TRIGL SERPL-MCNC: 65 MG/DL
WBC # BLD AUTO: 5.43 THOUSAND/UL (ref 4.31–10.16)

## 2022-01-22 PROCEDURE — 84165 PROTEIN E-PHORESIS SERUM: CPT | Performed by: PATHOLOGY

## 2022-01-22 PROCEDURE — 36415 COLL VENOUS BLD VENIPUNCTURE: CPT

## 2022-01-22 PROCEDURE — 80053 COMPREHEN METABOLIC PANEL: CPT

## 2022-01-22 PROCEDURE — 84165 PROTEIN E-PHORESIS SERUM: CPT

## 2022-01-22 PROCEDURE — 85025 COMPLETE CBC W/AUTO DIFF WBC: CPT

## 2022-01-22 PROCEDURE — 80061 LIPID PANEL: CPT

## 2022-01-22 PROCEDURE — G0103 PSA SCREENING: HCPCS

## 2022-01-25 LAB
ALBUMIN SERPL ELPH-MCNC: 4.55 G/DL (ref 3.5–5)
ALBUMIN SERPL ELPH-MCNC: 60.7 % (ref 52–65)
ALPHA1 GLOB SERPL ELPH-MCNC: 0.22 G/DL (ref 0.1–0.4)
ALPHA1 GLOB SERPL ELPH-MCNC: 2.9 % (ref 2.5–5)
ALPHA2 GLOB SERPL ELPH-MCNC: 0.73 G/DL (ref 0.4–1.2)
ALPHA2 GLOB SERPL ELPH-MCNC: 9.7 % (ref 7–13)
BETA GLOB ABNORMAL SERPL ELPH-MCNC: 0.41 G/DL (ref 0.4–0.8)
BETA1 GLOB SERPL ELPH-MCNC: 5.5 % (ref 5–13)
BETA2 GLOB SERPL ELPH-MCNC: 5.6 % (ref 2–8)
BETA2+GAMMA GLOB SERPL ELPH-MCNC: 0.42 G/DL (ref 0.2–0.5)
GAMMA GLOB ABNORMAL SERPL ELPH-MCNC: 1.17 G/DL (ref 0.5–1.6)
GAMMA GLOB SERPL ELPH-MCNC: 15.6 % (ref 12–22)
IGG/ALB SER: 1.54 {RATIO} (ref 1.1–1.8)
PROT PATTERN SERPL ELPH-IMP: NORMAL
PROT SERPL-MCNC: 7.5 G/DL (ref 6.4–8.2)

## 2022-05-10 ENCOUNTER — OFFICE VISIT (OUTPATIENT)
Dept: GASTROENTEROLOGY | Facility: AMBULARY SURGERY CENTER | Age: 52
End: 2022-05-10
Payer: COMMERCIAL

## 2022-05-10 ENCOUNTER — APPOINTMENT (OUTPATIENT)
Dept: LAB | Facility: AMBULARY SURGERY CENTER | Age: 52
End: 2022-05-10
Attending: INTERNAL MEDICINE
Payer: COMMERCIAL

## 2022-05-10 VITALS
BODY MASS INDEX: 29.89 KG/M2 | SYSTOLIC BLOOD PRESSURE: 128 MMHG | RESPIRATION RATE: 18 BRPM | HEART RATE: 82 BPM | WEIGHT: 201.8 LBS | OXYGEN SATURATION: 100 % | DIASTOLIC BLOOD PRESSURE: 76 MMHG | HEIGHT: 69 IN

## 2022-05-10 DIAGNOSIS — G89.29 ABDOMINAL PAIN, CHRONIC, RIGHT UPPER QUADRANT: Primary | ICD-10-CM

## 2022-05-10 DIAGNOSIS — R10.11 ABDOMINAL PAIN, CHRONIC, RIGHT UPPER QUADRANT: ICD-10-CM

## 2022-05-10 DIAGNOSIS — G89.29 ABDOMINAL PAIN, CHRONIC, RIGHT UPPER QUADRANT: ICD-10-CM

## 2022-05-10 DIAGNOSIS — R10.11 ABDOMINAL PAIN, CHRONIC, RIGHT UPPER QUADRANT: Primary | ICD-10-CM

## 2022-05-10 LAB
ALBUMIN SERPL BCP-MCNC: 4.3 G/DL (ref 3.5–5)
ALP SERPL-CCNC: 43 U/L (ref 46–116)
ALT SERPL W P-5'-P-CCNC: 77 U/L (ref 12–78)
ANION GAP SERPL CALCULATED.3IONS-SCNC: 3 MMOL/L (ref 4–13)
AST SERPL W P-5'-P-CCNC: 35 U/L (ref 5–45)
BILIRUB DIRECT SERPL-MCNC: 0.21 MG/DL (ref 0–0.2)
BILIRUB SERPL-MCNC: 1.05 MG/DL (ref 0.2–1)
BUN SERPL-MCNC: 13 MG/DL (ref 5–25)
CALCIUM SERPL-MCNC: 9.7 MG/DL (ref 8.3–10.1)
CHLORIDE SERPL-SCNC: 104 MMOL/L (ref 100–108)
CO2 SERPL-SCNC: 31 MMOL/L (ref 21–32)
CREAT SERPL-MCNC: 0.98 MG/DL (ref 0.6–1.3)
GFR SERPL CREATININE-BSD FRML MDRD: 88 ML/MIN/1.73SQ M
GLUCOSE SERPL-MCNC: 96 MG/DL (ref 65–140)
POTASSIUM SERPL-SCNC: 4.1 MMOL/L (ref 3.5–5.3)
PROT SERPL-MCNC: 7.6 G/DL (ref 6.4–8.2)
SODIUM SERPL-SCNC: 138 MMOL/L (ref 136–145)

## 2022-05-10 PROCEDURE — 80076 HEPATIC FUNCTION PANEL: CPT

## 2022-05-10 PROCEDURE — 3008F BODY MASS INDEX DOCD: CPT | Performed by: INTERNAL MEDICINE

## 2022-05-10 PROCEDURE — 36415 COLL VENOUS BLD VENIPUNCTURE: CPT

## 2022-05-10 PROCEDURE — 99213 OFFICE O/P EST LOW 20 MIN: CPT | Performed by: INTERNAL MEDICINE

## 2022-05-10 PROCEDURE — 80048 BASIC METABOLIC PNL TOTAL CA: CPT

## 2022-05-10 RX ORDER — DICYCLOMINE HYDROCHLORIDE 10 MG/1
10 CAPSULE ORAL
Qty: 90 CAPSULE | Refills: 2 | Status: SHIPPED | OUTPATIENT
Start: 2022-05-10

## 2022-05-10 NOTE — PROGRESS NOTES
[facility]  Angel Carney 46 y o  male MRN: [id number]            Assessment & Plan:    27-year-old gentleman with vague right-sided abdominal pain, sometimes migrates the left side, has a history of ampullary tubulovillous adenoma status post ampullectomy    1  Ampullary adenoma colon status post ampullectomy  -repeat EGD to be determined, will discuss with advanced endoscopist as far as timing    2  Vague right-sided abdominal pain:  Strong suspect musculoskeletal pain, he has a strong positional component  -we will check a CT scan of his abdomen pelvis   -recommended trial of dicyclomine  -if not improved would recommend abdominal binder   -have the patient follow up in next few months to re-evaluate        Edilma Benito was seen today for follow-up  Diagnoses and all orders for this visit:    Abdominal pain, chronic, right upper quadrant  -     CT abdomen pelvis w contrast; Future  -     dicyclomine (BENTYL) 10 mg capsule; Take 1 capsule (10 mg total) by mouth 4 (four) times a day (before meals and at bedtime)  -     Basic metabolic panel; Future  -     Hepatic function panel; Future            _____________________________________________________________        CC: Follow-up    HPI:  Angel Carney is a 46 y o male who is here for follow-up  Pleasant 27-year-old gentleman, initially presented with right-sided abdominal pain, had upper endoscopy and colonoscopy was found have a tubulovillous adenoma at his ampulla  Under 1 ampullectomy, last upper endoscopy in July of 2021 was unremarkable for recurrence  Patient reports he continues to have right-sided abdominal pain  Very vague symptoms, usually when he sitting I his computer, seems to be somewhat better with upright standing position  Does not seem to be worse with eating foods  Reports having regular bowel movements    Sometimes the pain does seem to migrate from his right side to his left side can be associated with increasing gas and bloating  He notes that he has tried to modify his diet eating healthier that this cause additional gas and bloating  He has regular bowel movements, denies any diarrhea, constipation, melena, rectal bleeding he he notes no change in his abdominal pain with bowel movements  Otherwise patient has been doing well from a GI standpoint    Reports that he continues to take pantoprazole regularly           ROS:  The remainder of the ROS was negative except for the pertinent positives mentioned in HPI  Allergies: Patient has no known allergies  Medications:   Current Outpatient Medications:     amLODIPine (NORVASC) 5 mg tablet, Take 1 tablet (5 mg total) by mouth daily, Disp: 90 tablet, Rfl: 1    Ascorbic Acid (VITAMIN C) 500 MG CAPS, Take 1 capsule by mouth daily, Disp: , Rfl:     hydrocortisone valerate (WEST-GA) 0 2 % ointment, As needed prefers the cream, Disp: , Rfl:     multivitamin (THERAGRAN) TABS, Take 1 tablet by mouth daily, Disp: , Rfl:     pantoprazole (PROTONIX) 40 mg tablet, TAKE 1 TABLET BY MOUTH EVERY DAY, Disp: 30 tablet, Rfl: 3    dicyclomine (BENTYL) 10 mg capsule, Take 1 capsule (10 mg total) by mouth 4 (four) times a day (before meals and at bedtime), Disp: 90 capsule, Rfl: 2    Past Medical History:   Diagnosis Date    Colon polyp     Essential hypertension     Hypertension     [place] teeth extracted        Past Surgical History:   Procedure Laterality Date    CATARACT EXTRACTION Left     COLONOSCOPY      HAND SURGERY      KNEE SURGERY Left     UPPER GASTROINTESTINAL ENDOSCOPY         Family History   Problem Relation Age of Onset    No Known Problems Mother     No Known Problems Father     Colon cancer Family     Diabetes Family         Mellitus    Colon cancer Maternal Grandmother         reports that he has quit smoking  His smoking use included cigarettes  He has never used smokeless tobacco  He reports current alcohol use   He reports that he does not use drugs        Physical Exam:    /76 (BP Location: Left arm, Patient Position: Sitting, Cuff Size: Standard)   Pulse 82   Resp 18   Ht 5' 9" (1 753 m)   Wt 91 5 kg (201 lb 12 8 oz)   SpO2 100%   BMI 29 80 kg/m²     Gen: wn/wd, NAD  HEENT: anicteric, MMM, no cervical LAD  CVS: RRR, no m/r/g  CHEST: CTA b/l  ABD: +BS, soft, NT,ND, no hepatosplenomegaly  EXT: no c/c/e  NEURO: aaox3  SKIN: NO rashes

## 2022-05-10 NOTE — LETTER
May 10, 2022     Abby LaboyAmanda  [address] [facility] 40 [address]    Patient: Иван Herrera   YOB: 1970   Date of Visit: 5/10/2022       Dear Dr Kenisha Cruz: Thank you for referring Vesta Mann to me for evaluation  Below are my notes for this consultation  If you have questions, please do not hesitate to call me  I look forward to following your patient along with you  Sincerely,        Figueroa Byrne MD        CC: No Recipients  Figueroa Byrne MD  5/10/2022 12:45 PM  Sign when Signing Visit  [address] [facility]  Иван Herrera 46 y o  male MRN: [id number]            Assessment & Plan:    59-year-old gentleman with vague right-sided abdominal pain, sometimes migrates the left side, has a history of ampullary tubulovillous adenoma status post ampullectomy    1  Ampullary adenoma colon status post ampullectomy  -repeat EGD to be determined, will discuss with advanced endoscopist as far as timing    2  Vague right-sided abdominal pain:  Strong suspect musculoskeletal pain, he has a strong positional component  -we will check a CT scan of his abdomen pelvis   -recommended trial of dicyclomine  -if not improved would recommend abdominal binder   -have the patient follow up in next few months to re-evaluate        Sania Arechiga was seen today for follow-up  Diagnoses and all orders for this visit:    Abdominal pain, chronic, right upper quadrant  -     CT abdomen pelvis w contrast; Future  -     dicyclomine (BENTYL) 10 mg capsule; Take 1 capsule (10 mg total) by mouth 4 (four) times a day (before meals and at bedtime)  -     Basic metabolic panel; Future  -     Hepatic function panel; Future            _____________________________________________________________        CC: Follow-up    HPI:  Иван Herrera is a 46 y o male who is here for follow-up    Pleasant 59-year-old gentleman, initially presented with right-sided abdominal pain, had upper endoscopy and colonoscopy was found have a tubulovillous adenoma at his ampulla  Under 1 ampullectomy, last upper endoscopy in July of 2021 was unremarkable for recurrence  Patient reports he continues to have right-sided abdominal pain  Very vague symptoms, usually when he sitting I his computer, seems to be somewhat better with upright standing position  Does not seem to be worse with eating foods  Reports having regular bowel movements  Sometimes the pain does seem to migrate from his right side to his left side can be associated with increasing gas and bloating  He notes that he has tried to modify his diet eating healthier that this cause additional gas and bloating  He has regular bowel movements, denies any diarrhea, constipation, melena, rectal bleeding he he notes no change in his abdominal pain with bowel movements  Otherwise patient has been doing well from a GI standpoint    Reports that he continues to take pantoprazole regularly           ROS:  The remainder of the ROS was negative except for the pertinent positives mentioned in HPI  Allergies: Patient has no known allergies      Medications:   Current Outpatient Medications:     amLODIPine (NORVASC) 5 mg tablet, Take 1 tablet (5 mg total) by mouth daily, Disp: 90 tablet, Rfl: 1    Ascorbic Acid (VITAMIN C) 500 MG CAPS, Take 1 capsule by mouth daily, Disp: , Rfl:     hydrocortisone valerate (WEST-GA) 0 2 % ointment, As needed prefers the cream, Disp: , Rfl:     multivitamin (THERAGRAN) TABS, Take 1 tablet by mouth daily, Disp: , Rfl:     pantoprazole (PROTONIX) 40 mg tablet, TAKE 1 TABLET BY MOUTH EVERY DAY, Disp: 30 tablet, Rfl: 3    dicyclomine (BENTYL) 10 mg capsule, Take 1 capsule (10 mg total) by mouth 4 (four) times a day (before meals and at bedtime), Disp: 90 capsule, Rfl: 2    Past Medical History:   Diagnosis Date    Colon polyp     Essential hypertension     Hypertension     [place] teeth extracted        Past Surgical History: Procedure Laterality Date    CATARACT EXTRACTION Left     COLONOSCOPY      HAND SURGERY      KNEE SURGERY Left     UPPER GASTROINTESTINAL ENDOSCOPY         Family History   Problem Relation Age of Onset    No Known Problems Mother     No Known Problems Father     Colon cancer Family     Diabetes Family         Mellitus    Colon cancer Maternal Grandmother         reports that he has quit smoking  His smoking use included cigarettes  He has never used smokeless tobacco  He reports current alcohol use  He reports that he does not use drugs        Physical Exam:    /76 (BP Location: Left arm, Patient Position: Sitting, Cuff Size: Standard)   Pulse 82   Resp 18   Ht 5' 9" (1 753 m)   Wt 91 5 kg (201 lb 12 8 oz)   SpO2 100%   BMI 29 80 kg/m²     Gen: wn/wd, NAD  HEENT: anicteric, MMM, no cervical LAD  CVS: RRR, no m/r/g  CHEST: CTA b/l  ABD: +BS, soft, NT,ND, no hepatosplenomegaly  EXT: no c/c/e  NEURO: aaox3  SKIN: NO rashes

## 2022-05-20 ENCOUNTER — HOSPITAL ENCOUNTER (OUTPATIENT)
Dept: RADIOLOGY | Facility: MEDICAL CENTER | Age: 52
Discharge: HOME | End: 2022-05-20
Payer: COMMERCIAL

## 2022-05-20 DIAGNOSIS — G89.29 OTHER CHRONIC PAIN: ICD-10-CM

## 2022-05-20 PROCEDURE — G1004 CDSM NDSC: HCPCS

## 2022-05-20 PROCEDURE — 74176 CT ABD & PELVIS W/O CONTRAST: CPT

## 2022-05-27 ENCOUNTER — TELEPHONE (OUTPATIENT)
Dept: GASTROENTEROLOGY | Facility: CLINIC | Age: 52
End: 2022-05-27

## 2022-05-27 NOTE — TELEPHONE ENCOUNTER
Left VM to schedule patient's EGD with Dr Minerva Iglesias at the hospital  Offered patient 07/28/22  Will await confirmation

## 2022-06-08 ENCOUNTER — RA CDI HCC (OUTPATIENT)
Dept: OTHER | Facility: HOSPITAL | Age: 52
End: 2022-06-08

## 2022-06-08 NOTE — PROGRESS NOTES
[facility]  coding opportunities       Chart reviewed, no opportunity found: CHART REVIEWED, NO OPPORTUNITY FOUND        Patients Insurance        Commercial Insurance: [address] [place] [address]

## 2022-06-22 ENCOUNTER — CLINICAL SUPPORT (OUTPATIENT)
Dept: INTERNAL MEDICINE CLINIC | Facility: CLINIC | Age: 52
End: 2022-06-22

## 2022-06-22 DIAGNOSIS — Z23 NEED FOR VACCINATION: Primary | ICD-10-CM

## 2022-06-22 PROCEDURE — 90471 IMMUNIZATION ADMIN: CPT

## 2022-06-22 PROCEDURE — 90750 HZV VACC RECOMBINANT IM: CPT

## 2022-07-28 ENCOUNTER — ANESTHESIA EVENT (OUTPATIENT)
Dept: GASTROENTEROLOGY | Facility: HOSPITAL | Age: 52
End: 2022-07-28

## 2022-07-28 ENCOUNTER — HOSPITAL ENCOUNTER (OUTPATIENT)
Dept: GASTROENTEROLOGY | Facility: HOSPITAL | Age: 52
Setting detail: OUTPATIENT SURGERY
Discharge: HOME | End: 2022-07-28
Attending: INTERNAL MEDICINE
Payer: COMMERCIAL

## 2022-07-28 ENCOUNTER — ANESTHESIA (OUTPATIENT)
Dept: GASTROENTEROLOGY | Facility: HOSPITAL | Age: 52
End: 2022-07-28

## 2022-07-28 VITALS
RESPIRATION RATE: 18 BRPM | OXYGEN SATURATION: 94 % | BODY MASS INDEX: 29.39 KG/M2 | WEIGHT: 198.41 LBS | DIASTOLIC BLOOD PRESSURE: 84 MMHG | HEIGHT: 69 IN | SYSTOLIC BLOOD PRESSURE: 123 MMHG | TEMPERATURE: 96.6 F | HEART RATE: 83 BPM

## 2022-07-28 DIAGNOSIS — D13.5 AMPULLARY ADENOMA: ICD-10-CM

## 2022-07-28 PROCEDURE — 43239 EGD BIOPSY SINGLE/MULTIPLE: CPT | Performed by: INTERNAL MEDICINE

## 2022-07-28 PROCEDURE — 88305 TISSUE EXAM BY PATHOLOGIST: CPT | Performed by: PATHOLOGY

## 2022-07-28 RX ORDER — SODIUM CHLORIDE, SODIUM LACTATE, POTASSIUM CHLORIDE, CALCIUM CHLORIDE 600; 310; 30; 20 MG/100ML; MG/100ML; MG/100ML; MG/100ML
INJECTION, SOLUTION INTRAVENOUS CONTINUOUS PRN
Status: DISCONTINUED | OUTPATIENT
Start: 2022-07-28 | End: 2022-07-28

## 2022-07-28 RX ORDER — LIDOCAINE HYDROCHLORIDE 10 MG/ML
INJECTION, SOLUTION EPIDURAL; INFILTRATION; INTRACAUDAL; PERINEURAL AS NEEDED
Status: DISCONTINUED | OUTPATIENT
Start: 2022-07-28 | End: 2022-07-28

## 2022-07-28 RX ORDER — LIDOCAINE HYDROCHLORIDE 20 MG/ML
INJECTION, SOLUTION EPIDURAL; INFILTRATION; INTRACAUDAL; PERINEURAL AS NEEDED
Status: DISCONTINUED | OUTPATIENT
Start: 2022-07-28 | End: 2022-07-28

## 2022-07-28 RX ORDER — PROPOFOL 10 MG/ML
INJECTION, EMULSION INTRAVENOUS CONTINUOUS PRN
Status: DISCONTINUED | OUTPATIENT
Start: 2022-07-28 | End: 2022-07-28

## 2022-07-28 RX ORDER — PROPOFOL 10 MG/ML
INJECTION, EMULSION INTRAVENOUS AS NEEDED
Status: DISCONTINUED | OUTPATIENT
Start: 2022-07-28 | End: 2022-07-28

## 2022-07-28 RX ORDER — GLYCOPYRROLATE 0.2 MG/ML
INJECTION INTRAMUSCULAR; INTRAVENOUS AS NEEDED
Status: DISCONTINUED | OUTPATIENT
Start: 2022-07-28 | End: 2022-07-28

## 2022-07-28 RX ADMIN — PROPOFOL 50 MG: 10 INJECTION, EMULSION INTRAVENOUS at 12:36

## 2022-07-28 RX ADMIN — PROPOFOL 100 MG: 10 INJECTION, EMULSION INTRAVENOUS at 12:39

## 2022-07-28 RX ADMIN — LIDOCAINE HYDROCHLORIDE 100 MG: 20 INJECTION, SOLUTION EPIDURAL; INFILTRATION; INTRACAUDAL; PERINEURAL at 12:26

## 2022-07-28 RX ADMIN — GLYCOPYRROLATE 0.1 MG: 0.2 INJECTION, SOLUTION INTRAMUSCULAR; INTRAVENOUS at 12:33

## 2022-07-28 RX ADMIN — SODIUM CHLORIDE, SODIUM LACTATE, POTASSIUM CHLORIDE, AND CALCIUM CHLORIDE: .6; .31; .03; .02 INJECTION, SOLUTION INTRAVENOUS at 12:25

## 2022-07-28 RX ADMIN — PROPOFOL 150 MG: 10 INJECTION, EMULSION INTRAVENOUS at 12:28

## 2022-07-28 RX ADMIN — PROPOFOL 50 MG: 10 INJECTION, EMULSION INTRAVENOUS at 12:31

## 2022-07-28 RX ADMIN — PROPOFOL 50 MG: 10 INJECTION, EMULSION INTRAVENOUS at 12:29

## 2022-07-28 RX ADMIN — PROPOFOL 150 MCG/KG/MIN: 10 INJECTION, EMULSION INTRAVENOUS at 12:36

## 2022-07-28 RX ADMIN — PROPOFOL 50 MG: 10 INJECTION, EMULSION INTRAVENOUS at 12:32

## 2022-07-28 NOTE — ANESTHESIA PREPROCEDURE EVALUATION
Procedure:  EGD    Relevant Problems   CARDIO   (+) Essential hypertension   (+) Hypertriglyceridemia   (+) Rib pain on right side      GI/HEPATIC   (+) Alpha-1-antitrypsin deficiency (HCC)   (+) Ampullary adenoma s/p ampullectomy   (+) Fatty liver      NEURO/PSYCH   (+) Anxiety      PULMONARY   (+) CHINTAN (obstructive sleep apnea)   CHINTAN - patient denies, denies snoring  'Slow to wake up' from prior endoscopies, per patient    Physical Exam    Airway    Mallampati score: II  TM Distance: >3 FB  Neck ROM: full     Dental   No notable dental hx     Cardiovascular  Rate: normal,     Pulmonary  Pulmonary exam normal     Other Findings        Anesthesia Plan  ASA Score- 3     Anesthesia Type- IV sedation with anesthesia with ASA Monitors  Additional Monitors:   Airway Plan:           Plan Factors-    Chart reviewed  EKG reviewed  Existing labs reviewed  Patient summary reviewed  Induction- intravenous  Postoperative Plan-     Informed Consent- Anesthetic plan and risks discussed with patient  I personally reviewed this patient with the CRNA  Discussed and agreed on the Anesthesia Plan with the CRNA  Carla Varghese

## 2022-07-28 NOTE — H&P
History and Physical -  [facility]  Azam Hamm 46 y o  male MRN: [id number]    HPI: Azam Hamm is a 46y o  year old male who presents with hx of ampullary adenoma  Review of Systems    Historical Information   Past Medical History:   Diagnosis Date    Colon polyp     Essential hypertension     Hypertension     [place] teeth extracted      Past Surgical History:   Procedure Laterality Date    CATARACT EXTRACTION Left     COLONOSCOPY      HAND SURGERY      KNEE SURGERY Left     UPPER GASTROINTESTINAL ENDOSCOPY       Social History   Social History     Substance and Sexual Activity   Alcohol Use Yes    Comment: Social     Social History     Substance and Sexual Activity   Drug Use No     Social History     Tobacco Use   Smoking Status Former Smoker    Types: Cigarettes   Smokeless Tobacco Never Used   Tobacco Comment    Per [employer]: quit 20 yrs ago  Smoked for 7 yrs about a half a pack of cigarettes a day     Family History   Problem Relation Age of Onset    No Known Problems Mother     No Known Problems Father     Colon cancer Family     Diabetes Family         Mellitus    Colon cancer Maternal Grandmother        Meds/Allergies     (Not in a hospital admission)      No Known Allergies    Objective     /93   Pulse 88   Temp (!) 96 8 °F (36 °C) (Temporal)   Resp 16   Ht 5' 9" (1 753 m)   Wt 90 kg (198 lb 6 6 oz)   SpO2 95%   BMI 29 30 kg/m²       PHYSICAL EXAM    Gen: NAD  CV: RRR  CHEST: Clear  ABD: soft, NT/ND  EXT: no edema  Neuro: AAO      ASSESSMENT/PLAN:  This is a 46y o  year old male here for  hx of ampullary adenoma         PLAN:   Procedure: Gi Cerrato

## 2022-07-28 NOTE — ANESTHESIA POSTPROCEDURE EVALUATION
Post-Op Assessment Note    CV Status:  Stable    Pain management: adequate     Mental Status:  Sleepy   Hydration Status:  Euvolemic   PONV Controlled:  Controlled   Airway Patency:  Patent      Post Op Vitals Reviewed: Yes      Staff: CRNA         No complications documented      BP   116/77   Temp     Pulse  95   Resp      SpO2   98

## 2022-08-04 ENCOUNTER — APPOINTMENT (OUTPATIENT)
Dept: LAB | Facility: MEDICAL CENTER | Age: 52
End: 2022-08-04

## 2022-08-04 DIAGNOSIS — Z00.8 HEALTH EXAMINATION IN POPULATION SURVEY: ICD-10-CM

## 2022-08-04 LAB
CHOLEST SERPL-MCNC: 191 MG/DL
HDLC SERPL-MCNC: 49 MG/DL
LDLC SERPL CALC-MCNC: 119 MG/DL (ref 0–100)
NONHDLC SERPL-MCNC: 142 MG/DL
TRIGL SERPL-MCNC: 117 MG/DL

## 2022-08-04 PROCEDURE — 80061 LIPID PANEL: CPT

## 2022-08-04 PROCEDURE — 36415 COLL VENOUS BLD VENIPUNCTURE: CPT

## 2022-08-04 PROCEDURE — 83036 HEMOGLOBIN GLYCOSYLATED A1C: CPT

## 2022-08-05 LAB
EST. AVERAGE GLUCOSE BLD GHB EST-MCNC: 108 MG/DL
HBA1C MFR BLD: 5.4 %

## 2022-08-15 ENCOUNTER — OFFICE VISIT (OUTPATIENT)
Dept: GASTROENTEROLOGY | Facility: AMBULARY SURGERY CENTER | Age: 52
End: 2022-08-15
Payer: COMMERCIAL

## 2022-08-15 VITALS
HEIGHT: 69 IN | RESPIRATION RATE: 18 BRPM | SYSTOLIC BLOOD PRESSURE: 140 MMHG | HEART RATE: 83 BPM | DIASTOLIC BLOOD PRESSURE: 78 MMHG | BODY MASS INDEX: 29.83 KG/M2 | WEIGHT: 201.4 LBS | OXYGEN SATURATION: 97 %

## 2022-08-15 DIAGNOSIS — K76.0 FATTY LIVER: ICD-10-CM

## 2022-08-15 DIAGNOSIS — E88.01 ALPHA-1-ANTITRYPSIN DEFICIENCY (HCC): ICD-10-CM

## 2022-08-15 DIAGNOSIS — D13.5 AMPULLARY ADENOMA: ICD-10-CM

## 2022-08-15 DIAGNOSIS — R17 ELEVATED BILIRUBIN: ICD-10-CM

## 2022-08-15 DIAGNOSIS — R09.81 NASAL CONGESTION: Primary | ICD-10-CM

## 2022-08-15 PROCEDURE — 99214 OFFICE O/P EST MOD 30 MIN: CPT | Performed by: PHYSICIAN ASSISTANT

## 2022-08-15 NOTE — PROGRESS NOTES
[facility] - Outpatient Follow-up Note  Moishe Cowden 46 y o  male MRN: [id number]  Encounter: [id number]          ASSESSMENT AND PLAN:      1  Right-sided abdominal pain  Reports history of symptoms for 15 years  Recently made dietary modifications with improvement of his symptoms  Did not try Bentyl yet  Bowel movements are regular     -gave handout on low FODMAP diet to use as a guide as patient has already been decreasing certain foods in his diet which she reports has helped his symptoms  -recommend trying IBgard or FDgard    -also recommend trying musculoskeletal management such as lidocaine patch, abdominal binder     -patient also suspects food allergies  Discussed I would suspect these are more of GI intolerance and no clear indication for allergy testing due to this  Patient would prefer referral to allergist due to nasal congestion and other symptoms  Will place referral now  2  Ampullary adenoma  Patient is status post resection in 2020  Recent EGD 7/22 with normal appearing ampulla  Biopsies were also benign  -will await physician result note to determine when repeat EGD would be recommended  3  Elevated bilirubin  4  Hepatic steatosis  Patient has had fluctuating liver function test for few years now  He does have a history of drinking around 5-6 beers a day since the age of 12  He also has alpha-1 antitrypsin deficiency  Recent abdominal imaging with normal contours of the liver besides hepatic steatosis  -repeat liver function tests as well as CBC to assess platelets  -strongly recommended decreasing alcohol use  Recommend following up with us or PCP for guidance on this if needed     -would recommend continuing to monitor abdominal imaging occasionally          Follow-up in 4-6 months  ______________________________________________________________________    SUBJECTIVE:      Moishe Cowden is a 55-year-old male with past medical history of hepatic steatosis, alpha 1 antitrypsin deficiency, CHINTAN, ampullary adenoma status post ampullectomy presents to the office for follow-up for abdominal pain  Patient was last seen in the office 3 months ago  He had repeat EGD 07/2022 which was completely normal   Biopsies of ampulla were also benign  Patient also is reporting some right-sided with abdominal pain thought to be musculoskeletal   Patient reports making dietary changes including decreasing bread, pasta, fruits and vegetables which has improvement in his right-sided  He does not notice a significantly association with food intake  Reports symptoms often get worse after sitting at his discharge for while and then standing up  He has not tried any musculoskeletal management yet  He did not try Bentyl  Episodes often lost a few hours  Patient also reports drinking 5-6 beers daily since the age of 12  He has had fluctuating LFTs in the past   Recent CT scan without contrast with normal contours of the liver  Last colonoscopy 10/2020 with repeat recommended in 5 years due to polyps  REVIEW OF SYSTEMS IS OTHERWISE NEGATIVE  Historical Information   Past Medical History:   Diagnosis Date    Colon polyp     Essential hypertension     Hypertension     [place] teeth extracted      Past Surgical History:   Procedure Laterality Date    CATARACT EXTRACTION Left     COLONOSCOPY      HAND SURGERY      KNEE SURGERY Left     UPPER GASTROINTESTINAL ENDOSCOPY       Social History   Social History     Substance and Sexual Activity   Alcohol Use Yes    Comment: Social     Social History     Substance and Sexual Activity   Drug Use No     Social History     Tobacco Use   Smoking Status Former Smoker    Types: Cigarettes   Smokeless Tobacco Never Used   Tobacco Comment    Per [employer]: quit 20 yrs ago   Smoked for 7 yrs about a half a pack of cigarettes a day     Family History   Problem Relation Age of Onset    No Known Problems Mother  No Known Problems Father     Colon cancer Family     Diabetes Family         Mellitus    Colon cancer Maternal Grandmother        Meds/Allergies       Current Outpatient Medications:     amLODIPine (NORVASC) 5 mg tablet    Ascorbic Acid (VITAMIN C) 500 MG CAPS    hydrocortisone valerate (WEST-GA) 0 2 % ointment    multivitamin (THERAGRAN) TABS    pantoprazole (PROTONIX) 40 mg tablet    dicyclomine (BENTYL) 10 mg capsule    No Known Allergies        Objective     Blood pressure 140/78, pulse 83, resp  rate 18, height 5' 9" (1 753 m), weight 91 4 kg (201 lb 6 4 oz), SpO2 97 %  Body mass index is 29 74 kg/m²  PHYSICAL EXAM:      General Appearance:   Alert, cooperative, no distress   HEENT:   Normocephalic, atraumatic, anicteric      Neck:  Supple, symmetrical, trachea midline   Lungs:   Clear to auscultation bilaterally; no rales, rhonchi or wheezing; respirations unlabored    Heart[de-identified]   Regular rate and rhythm; no murmur, rub, or gallop  Abdomen:   Soft, non-tender, non-distended; normal bowel sounds; no masses, no organomegaly    Genitalia:   Deferred    Rectal:   Deferred    Extremities:  No cyanosis, clubbing or edema    Pulses:  2+ and symmetric    Skin:  No jaundice, rashes, or lesions    Lymph nodes:  No palpable cervical lymphadenopathy        Lab Results:   No visits with results within 1 Day(s) from this visit     Latest known visit with results is:   Appointment on 08/04/2022   Component Date Value    Hemoglobin A1C 08/04/2022 5 4     EAG 08/04/2022 108     Cholesterol 08/04/2022 191     Triglycerides 08/04/2022 117     HDL, Direct 08/04/2022 49     LDL Calculated 08/04/2022 119 (A)    Non-HDL-Chol (CHOL-HDL) 08/04/2022 142          Radiology Results:   EGD    Result Date: 7/28/2022  Narrative: [address] [place] [address] [phone] DATE OF SERVICE: 7/28/22 PHYSICIAN(S): Attending: Forest Arellano MD Fellow: No Staff Documented INDICATION: Ampullary adenoma POST-OP DIAGNOSIS: See the impression below  PREPROCEDURE: Informed consent was obtained for the procedure, including sedation  Risks of perforation, hemorrhage, adverse drug reaction and aspiration were discussed  The patient was placed in the left lateral decubitus position  Patient was explained about the risks and benefits of the procedure  Risks including but not limited to bleeding, infection, and perforation were explained in detail  Also explained about less than 100% sensitivity with the exam and other alternatives  DETAILS OF PROCEDURE: Patient was taken to the procedure room where a time out was performed to confirm correct patient and correct procedure  The patient underwent monitored anesthesia care, which was administered by an anesthesia professional  The patient's blood pressure, heart rate, level of consciousness, respirations and oxygen were monitored throughout the procedure  The scope was advanced to the second part of the duodenum  Retroflexion was performed in the fundus  The patient experienced no blood loss  The procedure was not difficult  The patient tolerated the procedure well  There were no apparent complications  ANESTHESIA INFORMATION: ASA: III Anesthesia Type: IV Sedation with Anesthesia MEDICATIONS: No administrations occurring from 1225 to 1245 on 07/28/22 FINDINGS: The duodenum appeared normal  Performed random biopsy using biopsy forceps  The stomach appeared normal  Including retroflexed view of the cardia The esophagus appeared normal  The duodenum and major papilla appeared normal  Performed random biopsy using biopsy forceps   SPECIMENS: ID Type Source Tests Collected by Time Destination 1 : Cold Bx Duodenum Tissue Duodenum TISSUE EXAM Dre Herring MD 7/28/2022 12:33 PM  2 : Cold Bx Duodenal ampulla Tissue Ampulla of Vater TISSUE EXAM Dre Herring MD 7/28/2022 12:40 PM      Impression: Normal appearance of ampulla status post ampullectomy, this was biopsied Normal appearing duodenum, random biopsies taken Normal stomach and retroflexion Normal esophagus and GE junction RECOMMENDATION: Await pathology results  Discharge home Resume regular diet Resume home medications Follow-up biopsy results Call with any abdominal pain, bleeding, fevers  Selwyn Garcia MD

## 2022-10-12 PROBLEM — Z12.5 SCREENING PSA (PROSTATE SPECIFIC ANTIGEN): Status: RESOLVED | Noted: 2021-02-24 | Resolved: 2022-10-12

## 2022-11-07 ENCOUNTER — CLINICAL SUPPORT (OUTPATIENT)
Dept: INTERNAL MEDICINE CLINIC | Facility: CLINIC | Age: 52
End: 2022-11-07

## 2022-11-07 DIAGNOSIS — Z23 ENCOUNTER FOR IMMUNIZATION: Primary | ICD-10-CM

## 2022-12-23 ENCOUNTER — TELEMEDICINE (OUTPATIENT)
Dept: INTERNAL MEDICINE CLINIC | Facility: CLINIC | Age: 52
End: 2022-12-23

## 2022-12-23 VITALS — TEMPERATURE: 101.8 F

## 2022-12-23 DIAGNOSIS — R68.89 FLU-LIKE SYMPTOMS: Primary | ICD-10-CM

## 2022-12-23 RX ORDER — OSELTAMIVIR PHOSPHATE 75 MG/1
75 CAPSULE ORAL 2 TIMES DAILY
Qty: 10 CAPSULE | Refills: 0 | Status: SHIPPED | OUTPATIENT
Start: 2022-12-23 | End: 2022-12-28

## 2022-12-23 NOTE — PROGRESS NOTES
Virtual Regular Visit    Verification of patient location:    Patient is located in the following state in which I hold an active license [place]      Assessment/Plan:    Problem List Items Addressed This Visit    None  Visit Diagnoses     Flu-like symptoms    -  Primary    Relevant Medications    oseltamivir (TAMIFLU) 75 mg capsule        The case discussed with patient using patient centered shared decision making  The patient was counseled regarding instructions for management,-- risk factor reductions,-- prognosis,-- impressions,-- risks and benefits of treatment options,-- importance of compliance with treatment  I have reviewed the instructions with the patient, answering all questions to his satisfaction  Supportive care measures  ED for chest pain, difficulty breathing, weakenss, fever >104  F/u prn       Reason for visit is   Chief Complaint   Patient presents with   • Virtual Regular Visit   • Flu Symptoms        Encounter provider Jessica Mancera [address] [place] [address]    Provider located at [address] [place], [place] [id number]      Recent Visits  No visits were found meeting these conditions  Showing recent visits within past 7 days and meeting all other requirements  Today's Visits  Date Type Provider Dept   12/23/22 Telemedicine Butch Matamoros today's visits and meeting all other requirements  Future Appointments  No visits were found meeting these conditions  Showing future appointments within next 150 days and meeting all other requirements       The patient was identified by name and date of birth  Valdo Fitch was informed that this is a telemedicine visit and that the visit is being conducted through the [address] [employer] platform  He agrees to proceed     My office door was closed  No one else was in the room  He acknowledged consent and understanding of privacy and security of the video platform   The patient has agreed to participate and understands they can discontinue the visit at any time  Patient is aware this is a billable service  Subjective        Romaine Salinas is a 46 y o  male who presents for evaluation of influenza like symptoms  Symptoms include chills, dry cough, headache, myalgias, sinus and nasal congestion and fever and have been present for 2 days  Denies chest pain, shortness of breath, dizziness, weakness, gi upset  He has tried to alleviate the symptoms with rest and dayquil   with intermittent relief  High risk factors for influenza complications: none  Denies sick contacts  Vaccinated for flu  Covid testing neg x 2       Past Medical History:   Diagnosis Date   • Colon polyp    • Essential hypertension    • Hypertension    • [place] teeth extracted        Past Surgical History:   Procedure Laterality Date   • CATARACT EXTRACTION Left    • COLONOSCOPY     • HAND SURGERY     • KNEE SURGERY Left    • UPPER GASTROINTESTINAL ENDOSCOPY         Current Outpatient Medications   Medication Sig Dispense Refill   • oseltamivir (TAMIFLU) 75 mg capsule Take 1 capsule (75 mg total) by mouth 2 (two) times a day for 5 days 10 capsule 0   • amLODIPine (NORVASC) 5 mg tablet TAKE ONE TABLET BY MOUTH EVERY DAY 30 tablet 0   • Ascorbic Acid (VITAMIN C) 500 MG CAPS Take 1 capsule by mouth daily     • multivitamin (THERAGRAN) TABS Take 1 tablet by mouth daily       No current facility-administered medications for this visit  No Known Allergies    Review of Systems   Constitutional: Positive for chills, fatigue and fever  HENT: Positive for congestion and rhinorrhea  Negative for ear pain, sinus pain and sore throat  Respiratory: Positive for cough  Negative for shortness of breath  Cardiovascular: Negative for chest pain  Gastrointestinal: Negative  Musculoskeletal: Positive for myalgias  Neurological: Positive for headaches  Negative for dizziness and weakness         Video Exam    Vitals: 12/23/22 1524   Temp: (!) 101 8 °F (38 8 °C)       Physical Exam  Constitutional:       General: He is not in acute distress  Appearance: Normal appearance  He is ill-appearing  Pulmonary:      Effort: No respiratory distress  Neurological:      Mental Status: He is alert            I spent 10 minutes directly with the patient during this visit

## 2023-01-05 DIAGNOSIS — I10 ESSENTIAL HYPERTENSION: ICD-10-CM

## 2023-01-05 RX ORDER — AMLODIPINE BESYLATE 5 MG/1
TABLET ORAL
Qty: 90 TABLET | Refills: 0 | Status: SHIPPED | OUTPATIENT
Start: 2023-01-05

## 2023-03-23 ENCOUNTER — VBI (OUTPATIENT)
Dept: ADMINISTRATIVE | Facility: OTHER | Age: 53
End: 2023-03-23

## 2023-05-01 DIAGNOSIS — I10 ESSENTIAL HYPERTENSION: ICD-10-CM

## 2023-05-01 RX ORDER — AMLODIPINE BESYLATE 5 MG/1
TABLET ORAL
Qty: 90 TABLET | Refills: 0 | Status: SHIPPED | OUTPATIENT
Start: 2023-05-01

## 2023-06-13 ENCOUNTER — OFFICE VISIT (OUTPATIENT)
Dept: GASTROENTEROLOGY | Facility: AMBULARY SURGERY CENTER | Age: 53
End: 2023-06-13
Payer: COMMERCIAL

## 2023-06-13 VITALS
SYSTOLIC BLOOD PRESSURE: 160 MMHG | OXYGEN SATURATION: 98 % | DIASTOLIC BLOOD PRESSURE: 98 MMHG | HEIGHT: 69 IN | WEIGHT: 206 LBS | HEART RATE: 91 BPM | BODY MASS INDEX: 30.51 KG/M2

## 2023-06-13 DIAGNOSIS — R10.11 ABDOMINAL PAIN, CHRONIC, RIGHT UPPER QUADRANT: ICD-10-CM

## 2023-06-13 DIAGNOSIS — K76.0 FATTY LIVER: ICD-10-CM

## 2023-06-13 DIAGNOSIS — D13.5 AMPULLARY ADENOMA: Primary | ICD-10-CM

## 2023-06-13 DIAGNOSIS — G89.29 ABDOMINAL PAIN, CHRONIC, RIGHT UPPER QUADRANT: ICD-10-CM

## 2023-06-13 DIAGNOSIS — Z12.11 COLON CANCER SCREENING: ICD-10-CM

## 2023-06-13 DIAGNOSIS — E88.01 ALPHA-1-ANTITRYPSIN DEFICIENCY (HCC): ICD-10-CM

## 2023-06-13 DIAGNOSIS — R10.84 GENERALIZED ABDOMINAL PAIN: ICD-10-CM

## 2023-06-13 PROCEDURE — 99214 OFFICE O/P EST MOD 30 MIN: CPT | Performed by: INTERNAL MEDICINE

## 2023-06-13 RX ORDER — HYDROCORTISONE VALERATE CREAM 2 MG/G
CREAM TOPICAL
COMMUNITY
Start: 2023-03-24

## 2023-06-13 RX ORDER — OXYCODONE HYDROCHLORIDE AND ACETAMINOPHEN 5; 325 MG/1; MG/1
1 TABLET ORAL
COMMUNITY
Start: 2023-04-05

## 2023-06-13 RX ORDER — DICYCLOMINE HYDROCHLORIDE 10 MG/1
10 CAPSULE ORAL 3 TIMES DAILY PRN
Qty: 90 CAPSULE | Refills: 1 | Status: SHIPPED | OUTPATIENT
Start: 2023-06-13

## 2023-06-13 NOTE — LETTER
June 13, 2023     Amanda Franco  [address] [facility] [address]     Patient: Colleen Higginbotham   YOB: 1970   Date of Visit: 6/13/2023       Dear Dr Enma Flower: Thank you for referring Arash Fox to me for evaluation  Below are my notes for this consultation  If you have questions, please do not hesitate to call me  I look forward to following your patient along with you  Sincerely,        Rashaad Pires MD        CC: No Recipients    Rashaad Pires MD  6/13/2023  1:45 PM  Sign when Signing Visit  [address] [facility]  Colleen Higginbotham 48 y o  male MRN: [id number]            Assessment & Plan:  Pleasant 45-year-old gentleman here for follow-up, has a history of ampullary adenoma, generalized abdominal pain in his epigastrium, fatty liver disease  1   Ampullary adenoma: Status post ampullectomy, pathology from ampullectomy was benign, has had 2 subsequent EGDs which have been unremarkable with biopsies of the ampulla  -Recommend repeat EGD in another 1 year with biopsies of the ampullary area    2  Upper abdominal pain: Unclear etiology, has been present for several years  -CAT scans, MRI and endoscopic evaluations have been unremarkable  -Patient did not improve with PPI therapy  -Recommend trial of dicyclomine which she did not try the prescribed in the past    #3  Alcohol use with associated fatty liver disease: Discussed with the patient that given his alpha-1 antitrypsin deficiency, steatosis seen on previous imaging and elevated LFTs would strongly recommend that he reduce his alcohol intake as he can develop long-term liver disease  -Recommend checking LFTs  -We will check right upper quadrant ultrasound with elastography  -Follow-up in 3 to 4 months      Shantal Bhatia was seen today for follow-up  Diagnoses and all orders for this visit:    Ampullary adenoma s/p ampullectomy    Generalized abdominal pain  -     Basic metabolic panel;  Future  -     CBC and differential; Future  -     Hepatic function panel; Future  -     US elastography; Future  -     dicyclomine (BENTYL) 10 mg capsule; Take 1 capsule (10 mg total) by mouth 3 (three) times a day as needed (abd pain)    Abdominal pain, chronic, right upper quadrant    Colon cancer screening    Alpha-1-antitrypsin deficiency ([facility] [address] )    Fatty liver            _____________________________________________________________        CC: Follow-up    HPI:  Prasanth Polo is a 48 y o male who is here for follow-up  Pleasant 51-year-old gentleman, initially presented with right upper quadrant pain, had ampullary adenoma status post ampullectomy, pathology from ampullectomy was unremarkable  He had 2 subsequent EGDs with normal biopsies  Generally speaking has been doing well continues to have vague abdominal pain  Initially discussed as noted above right upper quadrant but now he notes that it typically starts in his left upper quadrant migrates across to his right upper quadrant  Seems to be present all day usually worse after eating does not seem to interfere with his quality of life  Not associated with any nausea, vomiting, change in bowel movements  Does not improve with bowel movement  Denies any melena or rectal bleeding  Weight has been stable  Continues to drink several beers per day, has about a case of beer per week  We have discussed this in the past with the patient  Patient is otherwise doing well, denies any significant NSAID use  He has some increasing stressors at work  ROS:  The remainder of the ROS was negative except for the pertinent positives mentioned in HPI  Allergies: Patient has no known allergies      Medications:   Current Outpatient Medications:   •  amLODIPine (NORVASC) 5 mg tablet, TAKE ONE TABLET BY MOUTH EVERY DAY, Disp: 90 tablet, Rfl: 0  •  Ascorbic Acid (VITAMIN C) 500 MG CAPS, Take 1 capsule by mouth daily, Disp: , Rfl:   •  dicyclomine (BENTYL) 10 mg capsule, "Take 1 capsule (10 mg total) by mouth 3 (three) times a day as needed (abd pain), Disp: 90 capsule, Rfl: 1  •  hydrocortisone (WESTCORT) 0 2 % cream, , Disp: , Rfl:   •  multivitamin (THERAGRAN) TABS, Take 1 tablet by mouth daily, Disp: , Rfl:   •  oxyCODONE-acetaminophen (PERCOCET) 5-325 mg per tablet, Take 1 tablet by mouth every 4 to 6 hours as needed for pain (Patient not taking: Reported on 6/13/2023), Disp: , Rfl:     Past Medical History:   Diagnosis Date   • Colon polyp    • Essential hypertension    • Hypertension    • [place] teeth extracted        Past Surgical History:   Procedure Laterality Date   • CATARACT EXTRACTION Left    • COLONOSCOPY     • HAND SURGERY     • KNEE SURGERY Left    • UPPER GASTROINTESTINAL ENDOSCOPY         Family History   Problem Relation Age of Onset   • No Known Problems Mother    • No Known Problems Father    • Colon cancer Family    • Diabetes Family         Mellitus   • Colon cancer Maternal Grandmother         reports that he has quit smoking  His smoking use included cigarettes  He has never used smokeless tobacco  He reports current alcohol use  He reports that he does not use drugs        Physical Exam:    /98 (BP Location: Right arm, Patient Position: Sitting, Cuff Size: Standard)   Pulse 91   Ht 5' 9\" (1 753 m)   Wt 93 4 kg (206 lb)   SpO2 98%   BMI 30 42 kg/m²     Gen: wn/wd, NAD, healthy-appearing gentleman  HEENT: anicteric, MMM, no cervical LAD  CVS: RRR, no m/r/g  CHEST: CTA b/l  ABD: +BS, soft, NT,ND, no hepatosplenomegaly  EXT: no c/c/e  NEURO: aaox3  SKIN: NO rashes    "

## 2023-06-13 NOTE — PROGRESS NOTES
[facility]  Wm Temple 48 y o  male MRN: [id number]            Assessment & Plan:  Pleasant 60-year-old gentleman here for follow-up, has a history of ampullary adenoma, generalized abdominal pain in his epigastrium, fatty liver disease  1   Ampullary adenoma: Status post ampullectomy, pathology from ampullectomy was benign, has had 2 subsequent EGDs which have been unremarkable with biopsies of the ampulla  -Recommend repeat EGD in another 1 year with biopsies of the ampullary area    2  Upper abdominal pain: Unclear etiology, has been present for several years  -CAT scans, MRI and endoscopic evaluations have been unremarkable  -Patient did not improve with PPI therapy  -Recommend trial of dicyclomine which she did not try the prescribed in the past    #3  Alcohol use with associated fatty liver disease: Discussed with the patient that given his alpha-1 antitrypsin deficiency, steatosis seen on previous imaging and elevated LFTs would strongly recommend that he reduce his alcohol intake as he can develop long-term liver disease  -Recommend checking LFTs  -We will check right upper quadrant ultrasound with elastography  -Follow-up in 3 to 4 months      José Daigle was seen today for follow-up  Diagnoses and all orders for this visit:    Ampullary adenoma s/p ampullectomy    Generalized abdominal pain  -     Basic metabolic panel; Future  -     CBC and differential; Future  -     Hepatic function panel; Future  -     US elastography; Future  -     dicyclomine (BENTYL) 10 mg capsule; Take 1 capsule (10 mg total) by mouth 3 (three) times a day as needed (abd pain)    Abdominal pain, chronic, right upper quadrant    Colon cancer screening    Alpha-1-antitrypsin deficiency ([facility] [address] )    Fatty liver            _____________________________________________________________        CC: Follow-up    HPI:  Wm Temple is a 48 y o male who is here for follow-up    Pleasant 60-year-old gentleman, initially presented with right upper quadrant pain, had ampullary adenoma status post ampullectomy, pathology from ampullectomy was unremarkable  He had 2 subsequent EGDs with normal biopsies  Generally speaking has been doing well continues to have vague abdominal pain  Initially discussed as noted above right upper quadrant but now he notes that it typically starts in his left upper quadrant migrates across to his right upper quadrant  Seems to be present all day usually worse after eating does not seem to interfere with his quality of life  Not associated with any nausea, vomiting, change in bowel movements  Does not improve with bowel movement  Denies any melena or rectal bleeding  Weight has been stable  Continues to drink several beers per day, has about a case of beer per week  We have discussed this in the past with the patient  Patient is otherwise doing well, denies any significant NSAID use  He has some increasing stressors at work  ROS:  The remainder of the ROS was negative except for the pertinent positives mentioned in HPI  Allergies: Patient has no known allergies      Medications:   Current Outpatient Medications:   •  amLODIPine (NORVASC) 5 mg tablet, TAKE ONE TABLET BY MOUTH EVERY DAY, Disp: 90 tablet, Rfl: 0  •  Ascorbic Acid (VITAMIN C) 500 MG CAPS, Take 1 capsule by mouth daily, Disp: , Rfl:   •  dicyclomine (BENTYL) 10 mg capsule, Take 1 capsule (10 mg total) by mouth 3 (three) times a day as needed (abd pain), Disp: 90 capsule, Rfl: 1  •  hydrocortisone (WESTCORT) 0 2 % cream, , Disp: , Rfl:   •  multivitamin (THERAGRAN) TABS, Take 1 tablet by mouth daily, Disp: , Rfl:   •  oxyCODONE-acetaminophen (PERCOCET) 5-325 mg per tablet, Take 1 tablet by mouth every 4 to 6 hours as needed for pain (Patient not taking: Reported on 6/13/2023), Disp: , Rfl:     Past Medical History:   Diagnosis Date   • Colon polyp    • Essential hypertension    • Hypertension    • [place] "teeth extracted        Past Surgical History:   Procedure Laterality Date   • CATARACT EXTRACTION Left    • COLONOSCOPY     • HAND SURGERY     • KNEE SURGERY Left    • UPPER GASTROINTESTINAL ENDOSCOPY         Family History   Problem Relation Age of Onset   • No Known Problems Mother    • No Known Problems Father    • Colon cancer Family    • Diabetes Family         Mellitus   • Colon cancer Maternal Grandmother         reports that he has quit smoking  His smoking use included cigarettes  He has never used smokeless tobacco  He reports current alcohol use  He reports that he does not use drugs        Physical Exam:    /98 (BP Location: Right arm, Patient Position: Sitting, Cuff Size: Standard)   Pulse 91   Ht 5' 9\" (1 753 m)   Wt 93 4 kg (206 lb)   SpO2 98%   BMI 30 42 kg/m²     Gen: wn/wd, NAD, healthy-appearing gentleman  HEENT: anicteric, MMM, no cervical LAD  CVS: RRR, no m/r/g  CHEST: CTA b/l  ABD: +BS, soft, NT,ND, no hepatosplenomegaly  EXT: no c/c/e  NEURO: aaox3  SKIN: NO rashes    "

## 2023-07-10 ENCOUNTER — APPOINTMENT (OUTPATIENT)
Dept: LAB | Facility: CLINIC | Age: 53
End: 2023-07-10
Payer: COMMERCIAL

## 2023-07-10 ENCOUNTER — HOSPITAL ENCOUNTER (OUTPATIENT)
Dept: ULTRASOUND IMAGING | Facility: HOSPITAL | Age: 53
Discharge: HOME | End: 2023-07-10
Attending: INTERNAL MEDICINE
Payer: COMMERCIAL

## 2023-07-10 DIAGNOSIS — R10.84 GENERALIZED ABDOMINAL PAIN: ICD-10-CM

## 2023-07-10 LAB
ALBUMIN SERPL BCP-MCNC: 4.5 G/DL (ref 3.5–5)
ALP SERPL-CCNC: 41 U/L (ref 34–104)
ALT SERPL W P-5'-P-CCNC: 38 U/L (ref 7–52)
ANION GAP SERPL CALCULATED.3IONS-SCNC: 6 MMOL/L
AST SERPL W P-5'-P-CCNC: 25 U/L (ref 13–39)
BASOPHILS # BLD AUTO: 0.05 THOUSANDS/ÂΜL (ref 0–0.1)
BASOPHILS NFR BLD AUTO: 1 % (ref 0–1)
BILIRUB DIRECT SERPL-MCNC: 0.14 MG/DL (ref 0–0.2)
BILIRUB SERPL-MCNC: 1.13 MG/DL (ref 0.2–1)
BUN SERPL-MCNC: 15 MG/DL (ref 5–25)
CALCIUM SERPL-MCNC: 9.7 MG/DL (ref 8.4–10.2)
CHLORIDE SERPL-SCNC: 103 MMOL/L (ref 96–108)
CO2 SERPL-SCNC: 29 MMOL/L (ref 21–32)
CREAT SERPL-MCNC: 0.98 MG/DL (ref 0.6–1.3)
EOSINOPHIL # BLD AUTO: 0.06 THOUSAND/ÂΜL (ref 0–0.61)
EOSINOPHIL NFR BLD AUTO: 1 % (ref 0–6)
ERYTHROCYTE [DISTWIDTH] IN BLOOD BY AUTOMATED COUNT: 12.2 % (ref 11.6–15.1)
GFR SERPL CREATININE-BSD FRML MDRD: 87 ML/MIN/1.73SQ M
GLUCOSE P FAST SERPL-MCNC: 112 MG/DL (ref 65–99)
HCT VFR BLD AUTO: 49 % (ref 36.5–49.3)
HGB BLD-MCNC: 16.7 G/DL (ref 12–17)
IMM GRANULOCYTES # BLD AUTO: 0.05 THOUSAND/UL (ref 0–0.2)
IMM GRANULOCYTES NFR BLD AUTO: 1 % (ref 0–2)
LYMPHOCYTES # BLD AUTO: 1.76 THOUSANDS/ÂΜL (ref 0.6–4.47)
LYMPHOCYTES NFR BLD AUTO: 23 % (ref 14–44)
MCH RBC QN AUTO: 32.1 PG (ref 26.8–34.3)
MCHC RBC AUTO-ENTMCNC: 34.1 G/DL (ref 31.4–37.4)
MCV RBC AUTO: 94 FL (ref 82–98)
MONOCYTES # BLD AUTO: 0.68 THOUSAND/ÂΜL (ref 0.17–1.22)
MONOCYTES NFR BLD AUTO: 9 % (ref 4–12)
NEUTROPHILS # BLD AUTO: 5.1 THOUSANDS/ÂΜL (ref 1.85–7.62)
NEUTS SEG NFR BLD AUTO: 65 % (ref 43–75)
NRBC BLD AUTO-RTO: 0 /100 WBCS
PLATELET # BLD AUTO: 203 THOUSANDS/UL (ref 149–390)
PMV BLD AUTO: 9.9 FL (ref 8.9–12.7)
POTASSIUM SERPL-SCNC: 4.6 MMOL/L (ref 3.5–5.3)
PROT SERPL-MCNC: 7.4 G/DL (ref 6.4–8.4)
RBC # BLD AUTO: 5.2 MILLION/UL (ref 3.88–5.62)
SODIUM SERPL-SCNC: 138 MMOL/L (ref 135–147)
WBC # BLD AUTO: 7.7 THOUSAND/UL (ref 4.31–10.16)

## 2023-07-10 PROCEDURE — 85025 COMPLETE CBC W/AUTO DIFF WBC: CPT

## 2023-07-10 PROCEDURE — 76981 USE PARENCHYMA: CPT

## 2023-07-10 PROCEDURE — 36415 COLL VENOUS BLD VENIPUNCTURE: CPT

## 2023-07-10 PROCEDURE — 80048 BASIC METABOLIC PNL TOTAL CA: CPT

## 2023-07-10 PROCEDURE — 80076 HEPATIC FUNCTION PANEL: CPT

## 2023-07-20 ENCOUNTER — TELEPHONE (OUTPATIENT)
Dept: GASTROENTEROLOGY | Facility: AMBULARY SURGERY CENTER | Age: 53
End: 2023-07-20

## 2023-08-14 ENCOUNTER — APPOINTMENT (OUTPATIENT)
Dept: LAB | Facility: MEDICAL CENTER | Age: 53
End: 2023-08-14
Payer: COMMERCIAL

## 2023-08-14 DIAGNOSIS — K76.0 FATTY LIVER: ICD-10-CM

## 2023-08-14 LAB
ALBUMIN SERPL BCP-MCNC: 4.2 G/DL (ref 3.5–5)
ALP SERPL-CCNC: 46 U/L (ref 46–116)
ALT SERPL W P-5'-P-CCNC: 42 U/L (ref 12–78)
ANION GAP SERPL CALCULATED.3IONS-SCNC: 2 MMOL/L
AST SERPL W P-5'-P-CCNC: 18 U/L (ref 5–45)
BILIRUB SERPL-MCNC: 1.03 MG/DL (ref 0.2–1)
BUN SERPL-MCNC: 16 MG/DL (ref 5–25)
CALCIUM SERPL-MCNC: 9.8 MG/DL (ref 8.3–10.1)
CHLORIDE SERPL-SCNC: 110 MMOL/L (ref 96–108)
CO2 SERPL-SCNC: 29 MMOL/L (ref 21–32)
CREAT SERPL-MCNC: 1.06 MG/DL (ref 0.6–1.3)
ERYTHROCYTE [DISTWIDTH] IN BLOOD BY AUTOMATED COUNT: 11.7 % (ref 11.6–15.1)
GFR SERPL CREATININE-BSD FRML MDRD: 79 ML/MIN/1.73SQ M
GLUCOSE P FAST SERPL-MCNC: 113 MG/DL (ref 65–99)
HCT VFR BLD AUTO: 48.4 % (ref 36.5–49.3)
HGB BLD-MCNC: 16.2 G/DL (ref 12–17)
MCH RBC QN AUTO: 31.2 PG (ref 26.8–34.3)
MCHC RBC AUTO-ENTMCNC: 33.5 G/DL (ref 31.4–37.4)
MCV RBC AUTO: 93 FL (ref 82–98)
PLATELET # BLD AUTO: 215 THOUSANDS/UL (ref 149–390)
PMV BLD AUTO: 10.7 FL (ref 8.9–12.7)
POTASSIUM SERPL-SCNC: 4.7 MMOL/L (ref 3.5–5.3)
PROT SERPL-MCNC: 7.4 G/DL (ref 6.4–8.4)
RBC # BLD AUTO: 5.19 MILLION/UL (ref 3.88–5.62)
SODIUM SERPL-SCNC: 141 MMOL/L (ref 135–147)
WBC # BLD AUTO: 5.91 THOUSAND/UL (ref 4.31–10.16)

## 2023-08-14 PROCEDURE — 36415 COLL VENOUS BLD VENIPUNCTURE: CPT

## 2023-08-14 PROCEDURE — 80053 COMPREHEN METABOLIC PANEL: CPT

## 2023-08-14 PROCEDURE — 85027 COMPLETE CBC AUTOMATED: CPT

## 2023-08-30 DIAGNOSIS — I10 ESSENTIAL HYPERTENSION: ICD-10-CM

## 2023-08-30 RX ORDER — AMLODIPINE BESYLATE 5 MG/1
TABLET ORAL
Qty: 90 TABLET | Refills: 0 | Status: SHIPPED | OUTPATIENT
Start: 2023-08-30

## 2023-09-19 ENCOUNTER — RA CDI HCC (OUTPATIENT)
Dept: OTHER | Facility: HOSPITAL | Age: 53
End: 2023-09-19

## 2023-09-19 NOTE — PROGRESS NOTES
[address] [facility] coding opportunities       Chart reviewed, no opportunity found: CHART REVIEWED, NO OPPORTUNITY FOUND        Patients Insurance        Commercial Insurance: Alb[employer] Naranjo

## 2023-09-22 RX ORDER — CEPHALEXIN 500 MG/1
500 CAPSULE ORAL 3 TIMES DAILY
COMMUNITY
Start: 2023-07-12

## 2023-09-25 ENCOUNTER — OFFICE VISIT (OUTPATIENT)
Dept: GASTROENTEROLOGY | Facility: AMBULARY SURGERY CENTER | Age: 53
End: 2023-09-25
Payer: COMMERCIAL

## 2023-09-25 VITALS
BODY MASS INDEX: 28.44 KG/M2 | OXYGEN SATURATION: 98 % | WEIGHT: 192 LBS | DIASTOLIC BLOOD PRESSURE: 80 MMHG | SYSTOLIC BLOOD PRESSURE: 138 MMHG | HEART RATE: 70 BPM | HEIGHT: 69 IN

## 2023-09-25 DIAGNOSIS — E88.01 ALPHA-1-ANTITRYPSIN DEFICIENCY (HCC): ICD-10-CM

## 2023-09-25 DIAGNOSIS — D13.5 AMPULLARY ADENOMA: ICD-10-CM

## 2023-09-25 DIAGNOSIS — R07.81 RIB PAIN ON RIGHT SIDE: ICD-10-CM

## 2023-09-25 DIAGNOSIS — K76.0 FATTY LIVER: Primary | ICD-10-CM

## 2023-09-25 PROCEDURE — 99214 OFFICE O/P EST MOD 30 MIN: CPT | Performed by: INTERNAL MEDICINE

## 2023-09-25 NOTE — PROGRESS NOTES
[facility]  Christina Sawyer 48 y.o. male MRN: [id number]            Assessment & Plan:  Pleasant 77-year-old gentleman, remote history of ampullary adenoma, history of right-sided abdominal pain, history of fatty liver disease, possible early cirrhosis. 1.  Fatty liver disease with scarring on recent elastography, most likely secondary to alcohol and alpha-1 antitrypsin deficiency  -No evidence of splenomegaly or physical exam findings or laboratory studies consistent with cirrhosis, may have some advanced fibrosis  -Most likely secondary to alcoholic with also alpha-1 antitrypsin deficiency  -Patient has stopped drinking, did advise him to have no further alcohol ongoing, will continue to monitor  -He should have vaccination for hepatitis a and B  -We will repeat laboratory studies in 6 months time, consider repeat ultrasound at that time as well as alpha-fetoprotein  -Repeat elastography in 1 year  -Complete alcohol cessation was discussed with the patient    2. History of ampullary adenoma:  -Status post ampullectomy, last endoscopy for surveillance was negative  -Repeat EGD next year    3.  right-sided abdominal pain: Appears to be better with reducing carbohydrates and red meats  -Continue with dietary modification  -Extensive evaluation has been unremarkable for this in the past most likely functional/nonulcer dyspepsia      Sunil Finley was seen today for follow-up. Diagnoses and all orders for this visit:    Fatty liver    Rib pain on right side    Alpha-1-antitrypsin deficiency ([address])    Ampullary adenoma s/p ampullectomy            _____________________________________________________________        CC: Follow-up    HPI:  Christina Sawyer is a 48 y.o.male who is here for follow-up.   This is a pleasant 77-year-old gentleman, seen in the past for right-sided abdominal pain, endoscopic evaluation was suggestive of ampullary adenoma, status post ampullectomy, last endoscopy a year ago was unremarkable. Patient continues to have vague right-sided abdominal pain, he has switched his diet, avoiding carbohydrates and red meat and this has helped with those symptoms. Has fairly regular bowel moods, denies any nausea, vomiting, heartburn, dysphagia. Has had fatty liver changes on several ultrasounds in the past, had a recent ultrasound with elastography which actually demonstrated F4 fibrosis and cirrhosis, he has no evidence of hepatosplenomegaly on imaging studies, no physical exam findings of cirrhosis and a normal platelet count. Since that ultrasound patient has stopped drinking, previously was drinking 5-6 beers on a daily basis. ROS:  The remainder of the ROS was negative except for the pertinent positives mentioned in HPI. Allergies: Patient has no known allergies.     Medications:   Current Outpatient Medications:   •  amLODIPine (NORVASC) 5 mg tablet, TAKE ONE TABLET BY MOUTH EVERY DAY, Disp: 90 tablet, Rfl: 0  •  Ascorbic Acid (VITAMIN C) 500 MG CAPS, Take 1 capsule by mouth daily, Disp: , Rfl:   •  dicyclomine (BENTYL) 10 mg capsule, Take 1 capsule (10 mg total) by mouth 3 (three) times a day as needed (abd pain), Disp: 90 capsule, Rfl: 1  •  hydrocortisone (WESTCORT) 0.2 % cream, , Disp: , Rfl:   •  multivitamin (THERAGRAN) TABS, Take 1 tablet by mouth daily, Disp: , Rfl:   •  cephalexin (KEFLEX) 500 mg capsule, 500 mg 3 (three) times a day (Patient not taking: Reported on 9/25/2023), Disp: , Rfl:   •  oxyCODONE-acetaminophen (PERCOCET) 5-325 mg per tablet, Take 1 tablet by mouth every 4 to 6 hours as needed for pain (Patient not taking: Reported on 6/13/2023), Disp: , Rfl:     Past Medical History:   Diagnosis Date   • Colon polyp    • Essential hypertension    • Hypertension    • [place] teeth extracted        Past Surgical History:   Procedure Laterality Date   • CATARACT EXTRACTION Left    • COLONOSCOPY     • HAND SURGERY     • KNEE SURGERY Left    • UPPER GASTROINTESTINAL ENDOSCOPY         Family History   Problem Relation Age of Onset   • No Known Problems Mother    • No Known Problems Father    • Colon cancer Family    • Diabetes Family         Mellitus   • Colon cancer Maternal Grandmother         reports that he has quit smoking. His smoking use included cigarettes. He has never used smokeless tobacco. He reports current alcohol use. He reports that he does not use drugs.       Physical Exam:    /80 (BP Location: Right arm, Patient Position: Sitting, Cuff Size: Standard)   Pulse 70   Ht 5' 9" (1.753 m)   Wt 87.1 kg (192 lb)   SpO2 98%   BMI 28.35 kg/m²     Gen: wn/wd, NAD, healthy-appearing gentleman  HEENT: anicteric, MMM, no cervical LAD  CVS: RRR, no m/r/g  CHEST: CTA b/l  ABD: +BS, soft, NT,ND, no hepatosplenomegaly  EXT: no c/c/e  NEURO: aaox3  SKIN: NO rashes, no spider angiomata

## 2023-09-25 NOTE — LETTER
September 25, 2023     Sudheer Narvaez, [address]    Patient: Amy Lopez   YOB: 1970   Date of Visit: 9/25/2023       Dear Dr. Lindsey Stanton: Thank you for referring Sara Bañuelos to me for evaluation. Below are my notes for this consultation. If you have questions, please do not hesitate to call me. I look forward to following your patient along with you. Sincerely,        Wade Carrasquillo MD        CC: No Recipients    Wade Carrasquillo MD  9/25/2023  9:46 AM  Sign when Signing Visit  [address]  [facility]  Amy Lopez 48 y.o. male MRN: [id number]            Assessment & Plan:  Pleasant 45-year-old gentleman, remote history of ampullary adenoma, history of right-sided abdominal pain, history of fatty liver disease, possible early cirrhosis. 1.  Fatty liver disease with scarring on recent elastography, most likely secondary to alcohol and alpha-1 antitrypsin deficiency  -No evidence of splenomegaly or physical exam findings or laboratory studies consistent with cirrhosis, may have some advanced fibrosis  -Most likely secondary to alcoholic with also alpha-1 antitrypsin deficiency  -Patient has stopped drinking, did advise him to have no further alcohol ongoing, will continue to monitor  -He should have vaccination for hepatitis a and B  -We will repeat laboratory studies in 6 months time, consider repeat ultrasound at that time as well as alpha-fetoprotein  -Repeat elastography in 1 year  -Complete alcohol cessation was discussed with the patient    2.   History of ampullary adenoma:  -Status post ampullectomy, last endoscopy for surveillance was negative  -Repeat EGD next year    3.  right-sided abdominal pain: Appears to be better with reducing carbohydrates and red meats  -Continue with dietary modification  -Extensive evaluation has been unremarkable for this in the past most likely functional/nonulcer dyspepsia      Burnis Freshwater was seen today for follow-up. Diagnoses and all orders for this visit:    Fatty liver    Rib pain on right side    Alpha-1-antitrypsin deficiency ([address])    Ampullary adenoma s/p ampullectomy            _____________________________________________________________        CC: Follow-up    HPI:  Daria Huynh is a 48 y.o.male who is here for follow-up. This is a pleasant 17-year-old gentleman, seen in the past for right-sided abdominal pain, endoscopic evaluation was suggestive of ampullary adenoma, status post ampullectomy, last endoscopy a year ago was unremarkable. Patient continues to have vague right-sided abdominal pain, he has switched his diet, avoiding carbohydrates and red meat and this has helped with those symptoms. Has fairly regular bowel moods, denies any nausea, vomiting, heartburn, dysphagia. Has had fatty liver changes on several ultrasounds in the past, had a recent ultrasound with elastography which actually demonstrated F4 fibrosis and cirrhosis, he has no evidence of hepatosplenomegaly on imaging studies, no physical exam findings of cirrhosis and a normal platelet count. Since that ultrasound patient has stopped drinking, previously was drinking 5-6 beers on a daily basis. ROS:  The remainder of the ROS was negative except for the pertinent positives mentioned in HPI. Allergies: Patient has no known allergies.     Medications:   Current Outpatient Medications:   •  amLODIPine (NORVASC) 5 mg tablet, TAKE ONE TABLET BY MOUTH EVERY DAY, Disp: 90 tablet, Rfl: 0  •  Ascorbic Acid (VITAMIN C) 500 MG CAPS, Take 1 capsule by mouth daily, Disp: , Rfl:   •  dicyclomine (BENTYL) 10 mg capsule, Take 1 capsule (10 mg total) by mouth 3 (three) times a day as needed (abd pain), Disp: 90 capsule, Rfl: 1  •  hydrocortisone (WESTCORT) 0.2 % cream, , Disp: , Rfl:   •  multivitamin (THERAGRAN) TABS, Take 1 tablet by mouth daily, Disp: , Rfl:   •  cephalexin (KEFLEX) 500 mg capsule, 500 mg 3 (three) times a day (Patient not taking: Reported on 9/25/2023), Disp: , Rfl:   •  oxyCODONE-acetaminophen (PERCOCET) 5-325 mg per tablet, Take 1 tablet by mouth every 4 to 6 hours as needed for pain (Patient not taking: Reported on 6/13/2023), Disp: , Rfl:     Past Medical History:   Diagnosis Date   • Colon polyp    • Essential hypertension    • Hypertension    • [place] teeth extracted        Past Surgical History:   Procedure Laterality Date   • CATARACT EXTRACTION Left    • COLONOSCOPY     • HAND SURGERY     • KNEE SURGERY Left    • UPPER GASTROINTESTINAL ENDOSCOPY         Family History   Problem Relation Age of Onset   • No Known Problems Mother    • No Known Problems Father    • Colon cancer Family    • Diabetes Family         Mellitus   • Colon cancer Maternal Grandmother         reports that he has quit smoking. His smoking use included cigarettes. He has never used smokeless tobacco. He reports current alcohol use. He reports that he does not use drugs.       Physical Exam:    /80 (BP Location: Right arm, Patient Position: Sitting, Cuff Size: Standard)   Pulse 70   Ht 5' 9" (1.753 m)   Wt 87.1 kg (192 lb)   SpO2 98%   BMI 28.35 kg/m²     Gen: wn/wd, NAD, healthy-appearing gentleman  HEENT: anicteric, MMM, no cervical LAD  CVS: RRR, no m/r/g  CHEST: CTA b/l  ABD: +BS, soft, NT,ND, no hepatosplenomegaly  EXT: no c/c/e  NEURO: aaox3  SKIN: NO rashes, no spider angiomata

## 2023-09-27 ENCOUNTER — CLINICAL SUPPORT (OUTPATIENT)
Dept: INTERNAL MEDICINE CLINIC | Facility: CLINIC | Age: 53
End: 2023-09-27
Payer: COMMERCIAL

## 2023-09-27 DIAGNOSIS — Z23 NEED FOR VACCINATION: Primary | ICD-10-CM

## 2023-09-27 PROCEDURE — 90632 HEPA VACCINE ADULT IM: CPT

## 2023-09-27 PROCEDURE — 90472 IMMUNIZATION ADMIN EACH ADD: CPT

## 2023-09-27 PROCEDURE — 90746 HEPB VACCINE 3 DOSE ADULT IM: CPT

## 2023-09-27 PROCEDURE — 90471 IMMUNIZATION ADMIN: CPT

## 2023-10-23 ENCOUNTER — TELEPHONE (OUTPATIENT)
Age: 53
End: 2023-10-23

## 2023-11-08 ENCOUNTER — CLINICAL SUPPORT (OUTPATIENT)
Dept: INTERNAL MEDICINE CLINIC | Facility: CLINIC | Age: 53
End: 2023-11-08
Payer: COMMERCIAL

## 2023-11-08 DIAGNOSIS — Z23 ENCOUNTER FOR IMMUNIZATION: Primary | ICD-10-CM

## 2023-11-08 PROCEDURE — G0010 ADMIN HEPATITIS B VACCINE: HCPCS

## 2023-11-08 PROCEDURE — 90746 HEPB VACCINE 3 DOSE ADULT IM: CPT

## 2023-12-26 DIAGNOSIS — I10 ESSENTIAL HYPERTENSION: ICD-10-CM

## 2023-12-27 RX ORDER — AMLODIPINE BESYLATE 5 MG/1
TABLET ORAL
Qty: 90 TABLET | Refills: 0 | Status: SHIPPED | OUTPATIENT
Start: 2023-12-27

## 2024-02-21 PROBLEM — Z13.6 SCREENING FOR CARDIOVASCULAR CONDITION: Status: RESOLVED | Noted: 2018-07-02 | Resolved: 2024-02-21

## 2024-02-21 PROBLEM — Z12.11 COLON CANCER SCREENING: Status: RESOLVED | Noted: 2020-07-27 | Resolved: 2024-02-21

## 2024-03-25 ENCOUNTER — OFFICE VISIT (OUTPATIENT)
Dept: GASTROENTEROLOGY | Facility: AMBULARY SURGERY CENTER | Age: 54
End: 2024-03-25
Payer: COMMERCIAL

## 2024-03-25 VITALS
OXYGEN SATURATION: 99 % | BODY MASS INDEX: 27.78 KG/M2 | WEIGHT: 187.6 LBS | DIASTOLIC BLOOD PRESSURE: 74 MMHG | SYSTOLIC BLOOD PRESSURE: 136 MMHG | HEART RATE: 74 BPM | HEIGHT: 69 IN

## 2024-03-25 DIAGNOSIS — E88.01 ALPHA-1-ANTITRYPSIN DEFICIENCY (HCC): Primary | ICD-10-CM

## 2024-03-25 DIAGNOSIS — D13.5 AMPULLARY ADENOMA: ICD-10-CM

## 2024-03-25 DIAGNOSIS — K76.0 FATTY LIVER: ICD-10-CM

## 2024-03-25 DIAGNOSIS — R10.11 ABDOMINAL PAIN, CHRONIC, RIGHT UPPER QUADRANT: ICD-10-CM

## 2024-03-25 DIAGNOSIS — G89.29 ABDOMINAL PAIN, CHRONIC, RIGHT UPPER QUADRANT: ICD-10-CM

## 2024-03-25 PROCEDURE — 99214 OFFICE O/P EST MOD 30 MIN: CPT | Performed by: INTERNAL MEDICINE

## 2024-03-25 NOTE — PATIENT INSTRUCTIONS
Scheduled date of EGD(as of today): April 3, 2024  Physician performing EGD: Dr. Duffy  Location of EGD: [facility]  Instructions reviewed with patient by: Bel  Clearances: N/A

## 2024-03-25 NOTE — PROGRESS NOTES
[facility]  Morgan Austin 54 y.o. male MRN: [id number]            Assessment & Plan:  Pleasant 54-year-old gentleman, history of hepatic steatosis, history of alpha-1 antitrypsin deficiency, mild scarring on last elastography and steatosis.  Intermittent right upper quadrant pain.  Has a history of ampullary adenoma status post ampullectomy.    1.  History of ampullary adenoma:  -Schedule repeat EGD with evaluation of ampulla  Biopsy small intestine given elevated TTG in the past    2.  Alpha-1 antitrypsin and hepatic steatosis, fibrosis on prior elastography  -Will check repeat elastography  -Will check LFTs, MELD labs, AFP  -Continue with minimizing her complete avoidance of alcohol, patient has made significant changes to lifestyle with adequate weight loss    3.  Vague right upper quadrant pain: Longstanding for 20 years, occasionally postprandial occasion with stress  -Did not respond to dicyclomine  -Can consider empiric PPI therapy after endoscopic evaluation      Morgan was seen today for follow-up.    Diagnoses and all orders for this visit:    Alpha-1-antitrypsin deficiency (HCC)  -     US elastography; Future    Fatty liver  -     US elastography; Future  -     Basic metabolic panel; Future  -     CBC and differential; Future  -     Hepatic function panel; Future  -     Protime-INR; Future  -     AFP tumor marker; Future    Ampullary adenoma s/p ampullectomy  -     EGD; Future    Abdominal pain, chronic, right upper quadrant    Other orders  -     Diet NPO; Sips with meds; Standing  -     Void on call to OR; Standing            _____________________________________________________________        CC: Follow-up    HPI:  Morgan Austin is a 54 y.o.male who is here for follow-up.  This is a pleasant 54-year-old gentleman, seen in the past for hepatic steatosis, also found to have alpha-1 antitrypsin deficiency.  Noted to have some fibrosis on last elastography with some steatosis.   Patient has significant changed since her last visit he has cut down his alcohol drinking to 2-4 drinks per week occasionally.  No longer daily drinking.  He is lost 15 pounds.  Appetite is good.  Still has occasional right upper quadrant pain that migrates to his epigastrium and left upper quadrant usually worse after eating, also worse with stressors.  No significant change in bowel movements, no diarrhea, constipation, melena, rectal bleeding.    Patient did get vaccinated for hepatitis a and B after his last visit.          ROS:  The remainder of the ROS was negative except for the pertinent positives mentioned in HPI.      Allergies: Patient has no known allergies.    Medications:   Current Outpatient Medications:     amLODIPine (NORVASC) 5 mg tablet, TAKE ONE TABLET BY MOUTH EVERY DAY, Disp: 90 tablet, Rfl: 0    Ascorbic Acid (VITAMIN C) 500 MG CAPS, Take 1 capsule by mouth daily, Disp: , Rfl:     hydrocortisone (WESTCORT) 0.2 % cream, , Disp: , Rfl:     multivitamin (THERAGRAN) TABS, Take 1 tablet by mouth daily, Disp: , Rfl:     cephalexin (KEFLEX) 500 mg capsule, 500 mg 3 (three) times a day (Patient not taking: Reported on 9/25/2023), Disp: , Rfl:     dicyclomine (BENTYL) 10 mg capsule, Take 1 capsule (10 mg total) by mouth 3 (three) times a day as needed (abd pain) (Patient not taking: Reported on 3/25/2024), Disp: 90 capsule, Rfl: 1    oxyCODONE-acetaminophen (PERCOCET) 5-325 mg per tablet, Take 1 tablet by mouth every 4 to 6 hours as needed for pain (Patient not taking: Reported on 6/13/2023), Disp: , Rfl:     Past Medical History:   Diagnosis Date    Colon polyp     Essential hypertension     Hypertension     [place] teeth extracted        Past Surgical History:   Procedure Laterality Date    CATARACT EXTRACTION Left     COLONOSCOPY      HAND SURGERY      KNEE SURGERY Left     UPPER GASTROINTESTINAL ENDOSCOPY         Family History   Problem Relation Age of Onset    No Known Problems Mother     No Known  "Problems Father     Colon cancer Family     Diabetes Family         Mellitus    Colon cancer Maternal Grandmother         reports that he has quit smoking. His smoking use included cigarettes. He has never used smokeless tobacco. He reports current alcohol use. He reports that he does not use drugs.      Physical Exam:    /74 (BP Location: Left arm, Patient Position: Sitting, Cuff Size: Standard)   Pulse 74   Ht 5' 9\" (1.753 m)   Wt 85.1 kg (187 lb 9.6 oz)   SpO2 99%   BMI 27.70 kg/m²     Gen: wn/wd, NAD, healthy-appearing gentleman  HEENT: anicteric, MMM, no cervical LAD  CVS: RRR, no m/r/g  CHEST: CTA b/l  ABD: +BS, soft, NT,ND, no hepatosplenomegaly  EXT: no c/c/e  NEURO: aaox3  SKIN: NO rashes    "

## 2024-03-28 ENCOUNTER — TELEPHONE (OUTPATIENT)
Dept: GASTROENTEROLOGY | Facility: CLINIC | Age: 54
End: 2024-03-28

## 2024-03-28 NOTE — TELEPHONE ENCOUNTER
Left message confirming procedure for 4/3. He will need a  to and from the facility. He will be called the day before with his arrival time. If he doesn't have his procedure instructions they are in his my chart. However for this procedure it is nothing to eat after midnight Tuesday and he may have clear liquids up to 4 hours prior. Any questions, he may call.

## 2024-03-30 ENCOUNTER — APPOINTMENT (OUTPATIENT)
Dept: LAB | Facility: MEDICAL CENTER | Age: 54
End: 2024-03-30
Payer: COMMERCIAL

## 2024-03-30 DIAGNOSIS — K76.0 FATTY LIVER: ICD-10-CM

## 2024-03-30 LAB
AFP-TM SERPL-MCNC: 1.58 NG/ML (ref 0–9)
ALBUMIN SERPL BCP-MCNC: 4.4 G/DL (ref 3.5–5)
ALP SERPL-CCNC: 42 U/L (ref 34–104)
ALT SERPL W P-5'-P-CCNC: 24 U/L (ref 7–52)
ANION GAP SERPL CALCULATED.3IONS-SCNC: 8 MMOL/L (ref 4–13)
AST SERPL W P-5'-P-CCNC: 22 U/L (ref 13–39)
BASOPHILS # BLD AUTO: 0.05 THOUSANDS/ÂΜL (ref 0–0.1)
BASOPHILS NFR BLD AUTO: 1 % (ref 0–1)
BILIRUB DIRECT SERPL-MCNC: 0.22 MG/DL (ref 0–0.2)
BILIRUB SERPL-MCNC: 1.07 MG/DL (ref 0.2–1)
BUN SERPL-MCNC: 15 MG/DL (ref 5–25)
CALCIUM SERPL-MCNC: 9.7 MG/DL (ref 8.4–10.2)
CHLORIDE SERPL-SCNC: 100 MMOL/L (ref 96–108)
CO2 SERPL-SCNC: 32 MMOL/L (ref 21–32)
CREAT SERPL-MCNC: 1.03 MG/DL (ref 0.6–1.3)
EOSINOPHIL # BLD AUTO: 0.09 THOUSAND/ÂΜL (ref 0–0.61)
EOSINOPHIL NFR BLD AUTO: 1 % (ref 0–6)
ERYTHROCYTE [DISTWIDTH] IN BLOOD BY AUTOMATED COUNT: 12.7 % (ref 11.6–15.1)
GFR SERPL CREATININE-BSD FRML MDRD: 81 ML/MIN/1.73SQ M
GLUCOSE P FAST SERPL-MCNC: 109 MG/DL (ref 65–99)
HCT VFR BLD AUTO: 49.4 % (ref 36.5–49.3)
HGB BLD-MCNC: 16.4 G/DL (ref 12–17)
IMM GRANULOCYTES # BLD AUTO: 0.02 THOUSAND/UL (ref 0–0.2)
IMM GRANULOCYTES NFR BLD AUTO: 0 % (ref 0–2)
INR PPP: 0.99 (ref 0.84–1.19)
LYMPHOCYTES # BLD AUTO: 2.48 THOUSANDS/ÂΜL (ref 0.6–4.47)
LYMPHOCYTES NFR BLD AUTO: 38 % (ref 14–44)
MCH RBC QN AUTO: 30.7 PG (ref 26.8–34.3)
MCHC RBC AUTO-ENTMCNC: 33.2 G/DL (ref 31.4–37.4)
MCV RBC AUTO: 92 FL (ref 82–98)
MONOCYTES # BLD AUTO: 0.62 THOUSAND/ÂΜL (ref 0.17–1.22)
MONOCYTES NFR BLD AUTO: 10 % (ref 4–12)
NEUTROPHILS # BLD AUTO: 3.27 THOUSANDS/ÂΜL (ref 1.85–7.62)
NEUTS SEG NFR BLD AUTO: 50 % (ref 43–75)
NRBC BLD AUTO-RTO: 0 /100 WBCS
PLATELET # BLD AUTO: 229 THOUSANDS/UL (ref 149–390)
PMV BLD AUTO: 10.3 FL (ref 8.9–12.7)
POTASSIUM SERPL-SCNC: 4.6 MMOL/L (ref 3.5–5.3)
PROT SERPL-MCNC: 7.4 G/DL (ref 6.4–8.4)
PROTHROMBIN TIME: 13 SECONDS (ref 11.6–14.5)
RBC # BLD AUTO: 5.35 MILLION/UL (ref 3.88–5.62)
SODIUM SERPL-SCNC: 140 MMOL/L (ref 135–147)
WBC # BLD AUTO: 6.53 THOUSAND/UL (ref 4.31–10.16)

## 2024-03-30 PROCEDURE — 82105 ALPHA-FETOPROTEIN SERUM: CPT

## 2024-03-30 PROCEDURE — 80048 BASIC METABOLIC PNL TOTAL CA: CPT

## 2024-03-30 PROCEDURE — 85610 PROTHROMBIN TIME: CPT

## 2024-03-30 PROCEDURE — 36415 COLL VENOUS BLD VENIPUNCTURE: CPT

## 2024-03-30 PROCEDURE — 80076 HEPATIC FUNCTION PANEL: CPT

## 2024-03-30 PROCEDURE — 85025 COMPLETE CBC W/AUTO DIFF WBC: CPT

## 2024-04-01 ENCOUNTER — ANESTHESIA (OUTPATIENT)
Dept: ANESTHESIOLOGY | Facility: HOSPITAL | Age: 54
End: 2024-04-01

## 2024-04-01 ENCOUNTER — ANESTHESIA EVENT (OUTPATIENT)
Dept: ANESTHESIOLOGY | Facility: HOSPITAL | Age: 54
End: 2024-04-01

## 2024-04-03 ENCOUNTER — ANESTHESIA (OUTPATIENT)
Dept: GASTROENTEROLOGY | Facility: AMBULARY SURGERY CENTER | Age: 54
End: 2024-04-03

## 2024-04-03 ENCOUNTER — ANESTHESIA EVENT (OUTPATIENT)
Dept: GASTROENTEROLOGY | Facility: AMBULARY SURGERY CENTER | Age: 54
End: 2024-04-03

## 2024-04-03 ENCOUNTER — HOSPITAL ENCOUNTER (OUTPATIENT)
Dept: GASTROENTEROLOGY | Facility: AMBULARY SURGERY CENTER | Age: 54
Setting detail: OUTPATIENT SURGERY
Discharge: HOME | End: 2024-04-03
Attending: INTERNAL MEDICINE
Payer: COMMERCIAL

## 2024-04-03 VITALS
TEMPERATURE: 97.4 F | SYSTOLIC BLOOD PRESSURE: 112 MMHG | DIASTOLIC BLOOD PRESSURE: 77 MMHG | HEART RATE: 71 BPM | OXYGEN SATURATION: 96 % | RESPIRATION RATE: 19 BRPM

## 2024-04-03 DIAGNOSIS — D13.5 AMPULLARY ADENOMA: ICD-10-CM

## 2024-04-03 DIAGNOSIS — R07.81 RIB PAIN ON RIGHT SIDE: Primary | ICD-10-CM

## 2024-04-03 PROCEDURE — 88305 TISSUE EXAM BY PATHOLOGIST: CPT | Performed by: PATHOLOGY

## 2024-04-03 RX ORDER — PROPOFOL 10 MG/ML
INJECTION, EMULSION INTRAVENOUS AS NEEDED
Status: DISCONTINUED | OUTPATIENT
Start: 2024-04-03 | End: 2024-04-03

## 2024-04-03 RX ORDER — PANTOPRAZOLE SODIUM 40 MG/1
40 TABLET, DELAYED RELEASE ORAL DAILY
Qty: 30 TABLET | Refills: 3 | Status: SHIPPED | OUTPATIENT
Start: 2024-04-03

## 2024-04-03 RX ORDER — LIDOCAINE HYDROCHLORIDE 20 MG/ML
INJECTION, SOLUTION EPIDURAL; INFILTRATION; INTRACAUDAL; PERINEURAL AS NEEDED
Status: DISCONTINUED | OUTPATIENT
Start: 2024-04-03 | End: 2024-04-03

## 2024-04-03 RX ORDER — SODIUM CHLORIDE, SODIUM LACTATE, POTASSIUM CHLORIDE, CALCIUM CHLORIDE 600; 310; 30; 20 MG/100ML; MG/100ML; MG/100ML; MG/100ML
INJECTION, SOLUTION INTRAVENOUS CONTINUOUS PRN
Status: DISCONTINUED | OUTPATIENT
Start: 2024-04-03 | End: 2024-04-03

## 2024-04-03 RX ADMIN — PROPOFOL 50 MG: 10 INJECTION, EMULSION INTRAVENOUS at 12:49

## 2024-04-03 RX ADMIN — LIDOCAINE HYDROCHLORIDE 5 ML: 20 INJECTION, SOLUTION EPIDURAL; INFILTRATION; INTRACAUDAL; PERINEURAL at 12:49

## 2024-04-03 RX ADMIN — SODIUM CHLORIDE, SODIUM LACTATE, POTASSIUM CHLORIDE, AND CALCIUM CHLORIDE: .6; .31; .03; .02 INJECTION, SOLUTION INTRAVENOUS at 12:15

## 2024-04-03 RX ADMIN — PROPOFOL 50 MG: 10 INJECTION, EMULSION INTRAVENOUS at 12:54

## 2024-04-03 NOTE — H&P
History and Physical -  [facility]  Morgan Austin 54 y.o. male MRN: [id number]    HPI: Morgan Austin is a 54 y.o. year old male who presents with hx of ampullary adenoma, abd pain, abnormal celiac panel.      Review of Systems    Historical Information   Past Medical History:   Diagnosis Date    Colon polyp     Essential hypertension     Hypertension     [place] teeth extracted      Past Surgical History:   Procedure Laterality Date    CATARACT EXTRACTION Left     COLONOSCOPY      HAND SURGERY      KNEE SURGERY Left     UPPER GASTROINTESTINAL ENDOSCOPY       Social History   Social History     Substance and Sexual Activity   Alcohol Use Yes    Comment: Social     Social History     Substance and Sexual Activity   Drug Use No     Social History     Tobacco Use   Smoking Status Former    Types: Cigarettes   Smokeless Tobacco Never   Tobacco Comments    Per [employer]: quit 20 yrs ago. Smoked for 7 yrs about a half a pack of cigarettes a day     Family History   Problem Relation Age of Onset    No Known Problems Mother     No Known Problems Father     Cancer Maternal Grandmother     Colon cancer Maternal Grandmother     Colon cancer Family     Diabetes Family         Mellitus       Meds/Allergies     (Not in a hospital admission)      No Known Allergies    Objective     /83   Pulse 67   Temp (!) 97.2 °F (36.2 °C) (Temporal)   Resp 18   SpO2 99%       PHYSICAL EXAM    Gen: NAD  CV: RRR  CHEST: Clear  ABD: soft, NT/ND  EXT: no edema  Neuro: AAO      ASSESSMENT/PLAN:  This is a 54 y.o. year old male here for hx of ampullary adenoma, abd pain, abnormal celiac panel.    PLAN:   Procedure: egd

## 2024-04-03 NOTE — ANESTHESIA PREPROCEDURE EVALUATION
Procedure:  EGD    Relevant Problems   CARDIO   (+) Essential hypertension   (+) Hypertriglyceridemia   (+) Rib pain on right side      GI/HEPATIC   (+) Alpha-1-antitrypsin deficiency (HCC)   (+) Ampullary adenoma s/p ampullectomy   (+) Fatty liver      NEURO/PSYCH   (+) Anxiety      PULMONARY   (+) CHINTAN (obstructive sleep apnea)        Physical Exam    Airway    Mallampati score: III  TM Distance: >3 FB  Neck ROM: full     Dental       Cardiovascular  Cardiovascular exam normal    Pulmonary  Pulmonary exam normal     Other Findings      Abdominal pain   Abdominal pain, chronic, right upper quadrant   Alcohol abuse   Alpha-1-antitrypsin deficiency (HCC)   Ampullary adenoma s/p ampullectomy   Anxiety   Encounter for immunization   Essential hypertension   Fatty liver   Hypertriglyceridemia   CHINTAN (obstructive sleep apnea)   Prediabetes   Rib pain on right side   Screening for HIV (human immunodeficiency virus)   Snoring       Anesthesia Plan  ASA Score- 2     Anesthesia Type- IV sedation with anesthesia with ASA Monitors.         Additional Monitors:     Airway Plan:            Plan Factors-Exercise tolerance (METS): >4 METS.    Chart reviewed.  Imaging results reviewed. Existing labs reviewed. Patient summary reviewed.    Patient is not a current smoker.              Induction- intravenous.    Postoperative Plan-     Informed Consent- Anesthetic plan and risks discussed with patient.  I personally reviewed this patient with the CRNA. Discussed and agreed on the Anesthesia Plan with the CRNA..

## 2024-04-03 NOTE — DISCHARGE INSTRUCTIONS
Upper Endoscopy   WHAT YOU NEED TO KNOW:   An upper endoscopy is also called an upper gastrointestinal (GI) endoscopy, or an esophagogastroduodenoscopy (EGD). It is a procedure to examine the inside of your esophagus, stomach, and duodenum (first part of the small intestine) with a scope. You may feel bloated, gassy, or have some abdominal discomfort after your procedure. Your throat may be sore for 24 to 36 hours. You may burp or pass gas from air that is still inside your body.         DISCHARGE INSTRUCTIONS:   Seek care immediately if:   You have sudden, severe abdominal pain.     You have problems swallowing.     You have a large amount of black, sticky bowel movements or blood in your bowel movements.     You have sudden trouble breathing.     You feel weak, lightheaded, or faint or your heart beats faster than normal for you.     Contact your healthcare provider if:   You have a fever and chills.      You have nausea or are vomiting.      Your abdomen is bloated or feels full and hard.     You have abdominal pain.   You have black, sticky bowel movements or blood in your bowel movements.  You have not had a bowel movement for 3 days after your procedure.  You have rash or hives.  You have questions or concerns about your procedure.    Activity:   Do not lift, strain, or run for 24 hours after your procedure.     Rest after your procedure. You have been given medicine to relax you. Do not drive or make important decisions until the day after your procedure. Return to your normal activity as directed.     Relieve gas and discomfort from bloating by lying on your right side with a heating pad on your abdomen. You may need to take short walks to help the gas move out. Eat small meals until bloating is relieved.  Follow up with your healthcare provider as directed: Write down your questions so you remember to ask them during your visits.     If you take a “blood thinner”, please review the specific instructions  from your endoscopist about when you should resume it. These can be found in the “Recommendation” and “Your Medication list” sections of this After Visit Summary.     normal...

## 2024-04-03 NOTE — ANESTHESIA POSTPROCEDURE EVALUATION
Post-Op Assessment Note    CV Status:  Stable  Pain Score: 0    Pain management: adequate       Mental Status:  Alert and awake   Hydration Status:  Euvolemic   PONV Controlled:  Controlled   Airway Patency:  Patent     Post Op Vitals Reviewed: Yes    No anethesia notable event occurred.    Staff: CRNA               /77 (04/03/24 1334)    Temp      Pulse 71 (04/03/24 1334)   Resp 19 (04/03/24 1334)    SpO2 96 % (04/03/24 1334)

## 2024-04-03 NOTE — ANESTHESIA POSTPROCEDURE EVALUATION
Post-Op Assessment Note    CV Status:  Stable  Pain Score: 0    Pain management: adequate       Mental Status:  Sleepy   Hydration Status:  Stable and euvolemic   PONV Controlled:  None   Airway Patency:  Patent     Post Op Vitals Reviewed: Yes      Staff: CRNA               BP   117/69   Temp      Pulse 80   Resp 18   SpO2 99

## 2024-04-08 PROCEDURE — 88305 TISSUE EXAM BY PATHOLOGIST: CPT | Performed by: PATHOLOGY

## 2024-04-29 ENCOUNTER — TELEPHONE (OUTPATIENT)
Dept: GASTROENTEROLOGY | Facility: CLINIC | Age: 54
End: 2024-04-29

## 2024-04-29 NOTE — TELEPHONE ENCOUNTER
Patient is due for an EGD end of July with Dr. Duffy for hx of ampullary adenoma. Left message for patient to call and schedule.

## 2024-05-08 NOTE — TELEPHONE ENCOUNTER
Patient wife called in patient has EGD done 4/3 per notes in [facility] states await path results path results do not show recall and or to redo at any procedures states to have a fup OV spoke with office for clarification    OV [facility] 9/10 DR SWAN

## 2024-05-09 ENCOUNTER — CLINICAL SUPPORT (OUTPATIENT)
Dept: INTERNAL MEDICINE CLINIC | Facility: CLINIC | Age: 54
End: 2024-05-09
Payer: COMMERCIAL

## 2024-05-09 DIAGNOSIS — Z23 ENCOUNTER FOR IMMUNIZATION: Primary | ICD-10-CM

## 2024-05-09 PROCEDURE — G0010 ADMIN HEPATITIS B VACCINE: HCPCS

## 2024-05-09 PROCEDURE — 90746 HEPB VACCINE 3 DOSE ADULT IM: CPT

## 2024-06-05 DIAGNOSIS — I10 ESSENTIAL HYPERTENSION: ICD-10-CM

## 2024-06-05 RX ORDER — AMLODIPINE BESYLATE 5 MG/1
TABLET ORAL
Qty: 90 TABLET | Refills: 0 | Status: SHIPPED | OUTPATIENT
Start: 2024-06-05

## 2024-07-25 ENCOUNTER — HOSPITAL ENCOUNTER (OUTPATIENT)
Dept: ULTRASOUND IMAGING | Facility: HOSPITAL | Age: 54
Discharge: HOME | End: 2024-07-25
Attending: INTERNAL MEDICINE
Payer: COMMERCIAL

## 2024-07-25 DIAGNOSIS — E88.01 ALPHA-1-ANTITRYPSIN DEFICIENCY (HCC): ICD-10-CM

## 2024-07-25 DIAGNOSIS — K76.0 FATTY LIVER: ICD-10-CM

## 2024-07-25 PROCEDURE — 76981 USE PARENCHYMA: CPT

## 2024-08-20 DIAGNOSIS — Z00.6 ENCOUNTER FOR EXAMINATION FOR NORMAL COMPARISON OR CONTROL IN CLINICAL RESEARCH PROGRAM: ICD-10-CM

## 2024-08-22 DIAGNOSIS — D13.5 AMPULLARY ADENOMA: ICD-10-CM

## 2024-08-22 DIAGNOSIS — R07.81 RIB PAIN ON RIGHT SIDE: ICD-10-CM

## 2024-08-22 RX ORDER — PANTOPRAZOLE SODIUM 40 MG/1
40 TABLET, DELAYED RELEASE ORAL DAILY
Qty: 30 TABLET | Refills: 5 | Status: SHIPPED | OUTPATIENT
Start: 2024-08-22

## 2024-08-24 ENCOUNTER — APPOINTMENT (OUTPATIENT)
Dept: LAB | Facility: MEDICAL CENTER | Age: 54
End: 2024-08-24
Payer: COMMERCIAL

## 2024-08-24 DIAGNOSIS — Z00.8 HEALTH EXAMINATION IN POPULATION SURVEYS: ICD-10-CM

## 2024-08-24 DIAGNOSIS — Z00.6 ENCOUNTER FOR EXAMINATION FOR NORMAL COMPARISON OR CONTROL IN CLINICAL RESEARCH PROGRAM: ICD-10-CM

## 2024-08-24 LAB
CHOLEST SERPL-MCNC: 156 MG/DL
EST. AVERAGE GLUCOSE BLD GHB EST-MCNC: 117 MG/DL
HBA1C MFR BLD: 5.7 %
HDLC SERPL-MCNC: 42 MG/DL
LDLC SERPL CALC-MCNC: 89 MG/DL (ref 0–100)
NONHDLC SERPL-MCNC: 114 MG/DL
TRIGL SERPL-MCNC: 124 MG/DL

## 2024-08-24 PROCEDURE — 83036 HEMOGLOBIN GLYCOSYLATED A1C: CPT

## 2024-08-24 PROCEDURE — 80061 LIPID PANEL: CPT

## 2024-08-24 PROCEDURE — 36415 COLL VENOUS BLD VENIPUNCTURE: CPT

## 2024-09-03 LAB
APOB+LDLR+PCSK9 GENE MUT ANL BLD/T: NOT DETECTED
BRCA1+BRCA2 DEL+DUP + FULL MUT ANL BLD/T: NOT DETECTED
MLH1+MSH2+MSH6+PMS2 GN DEL+DUP+FUL M: NOT DETECTED

## 2024-09-10 ENCOUNTER — OFFICE VISIT (OUTPATIENT)
Dept: GASTROENTEROLOGY | Facility: AMBULARY SURGERY CENTER | Age: 54
End: 2024-09-10
Payer: COMMERCIAL

## 2024-09-10 VITALS
WEIGHT: 195.2 LBS | BODY MASS INDEX: 28.91 KG/M2 | HEIGHT: 69 IN | HEART RATE: 78 BPM | OXYGEN SATURATION: 96 % | SYSTOLIC BLOOD PRESSURE: 128 MMHG | DIASTOLIC BLOOD PRESSURE: 74 MMHG

## 2024-09-10 DIAGNOSIS — D13.5 AMPULLARY ADENOMA: ICD-10-CM

## 2024-09-10 DIAGNOSIS — R10.11 ABDOMINAL PAIN, CHRONIC, RIGHT UPPER QUADRANT: Primary | ICD-10-CM

## 2024-09-10 DIAGNOSIS — E88.01 ALPHA-1-ANTITRYPSIN DEFICIENCY (HCC): ICD-10-CM

## 2024-09-10 DIAGNOSIS — K76.0 FATTY LIVER: ICD-10-CM

## 2024-09-10 DIAGNOSIS — G89.29 ABDOMINAL PAIN, CHRONIC, RIGHT UPPER QUADRANT: Primary | ICD-10-CM

## 2024-09-10 PROCEDURE — 99214 OFFICE O/P EST MOD 30 MIN: CPT | Performed by: INTERNAL MEDICINE

## 2024-09-10 NOTE — PROGRESS NOTES
[facility]  Morgan Austin 54 y.o. male MRN: [id number]            Assessment & Plan:  This is a pleasant 54-year-old gentleman here for follow-up of elevated LFTs, abdominal pain, hepatic steatosis, with a history of an ampullary adenoma.    1.  Ampullary adenoma: Status post ampullectomy about 3 years ago with no recurrence  -Interval EGDs, can be scheduled with his next colonoscopy    2.  Hepatic steatosis: In the setting of alcohol consumption, alpha-1 antitrypsin deficiency  -Resolution of previously seen fibrosis on most recent elastography  -Continue with dietary modification, continue to minimize alcohol  -Follow-up in 1 years time with repeat LFTs, repeat imaging as needed  -No evidence of cirrhosis  -Encouraged the patient to continue his healthy lifestyle    3.  History of colon tubular adenoma  -Next colonoscopy in 2026    4.  Epigastric/right upper quadrant pain: Longstanding history of pain, extensive evaluation  -Unclear etiology, suspect a musculoskeletal component  -No concerning findings, have reassured the patient    Laboratory studies, imaging studies, prior endoscopy reports were reviewed.  History was independently obtained.      Morgan was seen today for follow-up.    Diagnoses and all orders for this visit:    Abdominal pain, chronic, right upper quadrant    Fatty liver    Alpha-1-antitrypsin deficiency (HCC)    Ampullary adenoma s/p ampullectomy            _____________________________________________________________        CC: Follow-up    HPI:  Morgan Austin is a 54 y.o.male who is here for follow-up.  This is a pleasant 54-year-old gentleman, initially seen for right upper quadrant pain, found to have an ampullary adenoma status post ampullectomy, has a history of fatty liver disease, hepatic steatosis in the setting of some alcohol consumption.  Most recent elastography demonstrates significant improvement, nearing resolution.  Patient reports that he is doing  well, still trying to minimize alcohol.  Drinking about 6 drinks per week.  He notes that his sharp right upper quadrant pain has largely improved, having occasional right upper quadrant dull discomfort, usually worse after eating.  He is try to modify his diet, reduce red meats.  Appetite is good, weight has been stable.  He is gained a few pounds but overall he is doing quite well.    Still taking pantoprazole once daily.  Not taking any dicyclomine.  On antihypertensive medications.    Overall patient's appears to be doing well.      ROS:  The remainder of the ROS was negative except for the pertinent positives mentioned in HPI.      Allergies: Patient has no known allergies.    Medications:   Current Outpatient Medications:     amLODIPine (NORVASC) 5 mg tablet, TAKE ONE TABLET BY MOUTH EVERY DAY, Disp: 90 tablet, Rfl: 0    Ascorbic Acid (VITAMIN C) 500 MG CAPS, Take 1 capsule by mouth daily, Disp: , Rfl:     dicyclomine (BENTYL) 10 mg capsule, Take 1 capsule (10 mg total) by mouth 3 (three) times a day as needed (abd pain), Disp: 90 capsule, Rfl: 1    hydrocortisone (WESTCORT) 0.2 % cream, , Disp: , Rfl:     multivitamin (THERAGRAN) TABS, Take 1 tablet by mouth daily, Disp: , Rfl:     pantoprazole (PROTONIX) 40 mg tablet, TAKE 1 TABLET BY MOUTH EVERY DAY, Disp: 30 tablet, Rfl: 5    Past Medical History:   Diagnosis Date    Colon polyp     Essential hypertension     Hypertension     [place] teeth extracted        Past Surgical History:   Procedure Laterality Date    CATARACT EXTRACTION Left     COLONOSCOPY      HAND SURGERY      KNEE SURGERY Left     UPPER GASTROINTESTINAL ENDOSCOPY         Family History   Problem Relation Age of Onset    No Known Problems Mother     No Known Problems Father     Cancer Maternal Grandmother     Colon cancer Maternal Grandmother     Colon cancer Family     Diabetes Family         Mellitus        reports that he has quit smoking. His smoking use included cigarettes. He has never  "used smokeless tobacco. He reports current alcohol use. He reports that he does not use drugs.      Physical Exam:    /74 (BP Location: Left arm, Patient Position: Sitting, Cuff Size: Standard)   Pulse 78   Ht 5' 9\" (1.753 m)   Wt 88.5 kg (195 lb 3.2 oz)   SpO2 96%   BMI 28.83 kg/m²     Gen: wn/wd, NAD, healthy-appearing gentleman  HEENT: anicteric, MMM, no cervical LAD  CVS: RRR, no m/r/g  CHEST: CTA b/l  ABD: +BS, soft, NT,ND, no hepatosplenomegaly  EXT: no c/c/e  NEURO: aaox3  SKIN: NO rashes    "

## 2024-10-02 DIAGNOSIS — I10 ESSENTIAL HYPERTENSION: ICD-10-CM

## 2024-10-03 RX ORDER — AMLODIPINE BESYLATE 5 MG/1
TABLET ORAL
Qty: 90 TABLET | Refills: 0 | Status: SHIPPED | OUTPATIENT
Start: 2024-10-03

## 2025-01-13 ENCOUNTER — RA CDI HCC (OUTPATIENT)
Dept: OTHER | Facility: HOSPITAL | Age: 55
End: 2025-01-13

## 2025-01-14 ENCOUNTER — TELEPHONE (OUTPATIENT)
Age: 55
End: 2025-01-14

## 2025-01-14 NOTE — TELEPHONE ENCOUNTER
Patient called to reschedule his physical that was supoosed to be for today with Dr Martínez. However, I did not see any open availability for him anytime soon for that length of time. Please call patient to see what we can do.

## 2025-01-23 ENCOUNTER — OFFICE VISIT (OUTPATIENT)
Dept: INTERNAL MEDICINE CLINIC | Facility: CLINIC | Age: 55
End: 2025-01-23
Payer: COMMERCIAL

## 2025-01-23 VITALS
SYSTOLIC BLOOD PRESSURE: 126 MMHG | HEART RATE: 70 BPM | BODY MASS INDEX: 29.47 KG/M2 | DIASTOLIC BLOOD PRESSURE: 80 MMHG | WEIGHT: 199 LBS | HEIGHT: 69 IN | OXYGEN SATURATION: 98 %

## 2025-01-23 DIAGNOSIS — Z23 ENCOUNTER FOR IMMUNIZATION: ICD-10-CM

## 2025-01-23 DIAGNOSIS — E78.1 HYPERTRIGLYCERIDEMIA: ICD-10-CM

## 2025-01-23 DIAGNOSIS — K76.0 FATTY LIVER: ICD-10-CM

## 2025-01-23 DIAGNOSIS — I10 ESSENTIAL HYPERTENSION: Primary | ICD-10-CM

## 2025-01-23 DIAGNOSIS — R73.03 PREDIABETES: ICD-10-CM

## 2025-01-23 DIAGNOSIS — Z00.00 ENCOUNTER FOR ANNUAL PHYSICAL EXAM: ICD-10-CM

## 2025-01-23 DIAGNOSIS — G47.33 OSA (OBSTRUCTIVE SLEEP APNEA): ICD-10-CM

## 2025-01-23 PROCEDURE — 99396 PREV VISIT EST AGE 40-64: CPT

## 2025-01-23 PROCEDURE — 90673 RIV3 VACCINE NO PRESERV IM: CPT

## 2025-01-23 PROCEDURE — 90471 IMMUNIZATION ADMIN: CPT

## 2025-01-23 NOTE — ASSESSMENT & PLAN NOTE
Patient follows with gastroenterology.  There has been improvement in his elastography  Orders:    Comprehensive metabolic panel; Future    CBC and differential; Future

## 2025-01-23 NOTE — ASSESSMENT & PLAN NOTE
Stable with lifestyle modification.  Will repeat lipid panel yearly.  Orders:    Lipid Panel with Direct LDL reflex; Future

## 2025-01-23 NOTE — PROGRESS NOTES
Name: Morgan Austin      : 1970      MRN: [id number]  Encounter Provider: Alec Holbrook MD  Encounter Date: 2025   Encounter department: [facility]  :  Assessment & Plan  Essential hypertension  BP today was 126/80.  Stable on amlodipine       Prediabetes  Patient's last hemoglobin A1c have been stable.  Will check hemoglobin A1c this year  Orders:    Hemoglobin A1C; Future    Hypertriglyceridemia  Stable with lifestyle modification.  Will repeat lipid panel yearly.  Orders:    Lipid Panel with Direct LDL reflex; Future    Encounter for immunization  Patient would like to get the flu vaccine.    Orders:    influenza vaccine, recombinant, PF, 0.5 mL IM (Flublok)    Encounter for annual physical exam  Patient presented today for annual wellness visit. States that they are doing well. Patient updated me about their health condition. Patient states having well balanced diet, walking, 1-2 times a week on average, and 1-2 hours on average. Hearing is normal - bilateral. gets 4-6 hours of sleep on average, wears glasses      Vaccinations: discussed shingrix, covid booster, RSV vaccine  Advance care planning: none  Colon cancer screen: Due in   Recommended him to continue following a healthy diet and regular exercise     Fatty liver  Patient follows with gastroenterology.  There has been improvement in his elastography  Orders:    Comprehensive metabolic panel; Future    CBC and differential; Future          Depression Screening and Follow-up Plan: Patient was screened for depression during today's encounter. They screened negative with a PHQ-2 score of 0.      History of Present Illness   55 year old man with past medical history of ampullary adenoma s/p ampullectomy 3 years ago with interval EGDs, hepatic steatosis in the setting of alcohol consumption and alpha-1 antitrypsin deficiency, history of colon tubular adenoma, presenting today for annual physical exam.  Denies any  "recent hospitalizations or urgent care visits.  States that he has been doing well apart from the regular cold once in a while.  Denies any changes in his appetite, has lost 15 pounds over the last few years. Stays active on the weekends. Sleeps around 6 hours every night. Does not feel too stressed. Denies depression. Smoker, quit 30 years ago. Smoked 1/2 a pack a day. Alcohol consumption has reduced to 3-4 glasses every few days.      Review of Systems   Constitutional:  Negative for chills and fever.   HENT:  Negative for ear pain and sore throat.    Eyes:  Negative for pain and visual disturbance.   Respiratory:  Negative for cough and shortness of breath.    Cardiovascular:  Negative for chest pain and palpitations.   Gastrointestinal:  Negative for abdominal pain and vomiting.   Genitourinary:  Negative for dysuria and hematuria.   Musculoskeletal:  Negative for arthralgias and back pain.   Skin:  Negative for color change and rash.   Neurological:  Negative for seizures and syncope.   Hematological:  Does not bruise/bleed easily.   All other systems reviewed and are negative.      Objective   /80 (BP Location: Left arm, Patient Position: Sitting, Cuff Size: Standard)   Pulse 70   Ht 5' 9\" (1.753 m)   Wt 90.3 kg (199 lb)   SpO2 98%   BMI 29.39 kg/m²      Physical Exam    Nutrition Assessment and Intervention:       Other interventions: I discussed with the patient regarding follow a low fat, low cholesterol diet and decrease or avoid alcohol intake. Patient is advised to incorporate whole grain foods and complex carbohydrates, nuts and seeds, bananas.    Physical Activity Assessment and Intervention:    Activity journal reviewed      Other interventions: Walks during the weekend.    Discussed to incorporate 30 minutes of brisk walk to her daily routine, and start by trying to walk in 10 minute intervals, for long term cardiac and health benefits.    Emotional and Mental Well-being, Sleep, " Connectedness Assessment and Intervention:    Sleep/stress assessment performed

## 2025-01-23 NOTE — ASSESSMENT & PLAN NOTE
Patient would like to get the flu vaccine.    Orders:    influenza vaccine, recombinant, PF, 0.5 mL IM (Flublok)

## 2025-01-23 NOTE — ASSESSMENT & PLAN NOTE
Patient's last hemoglobin A1c have been stable.  Will check hemoglobin A1c this year  Orders:    Hemoglobin A1C; Future

## 2025-02-06 ENCOUNTER — CLINICAL SUPPORT (OUTPATIENT)
Dept: INTERNAL MEDICINE CLINIC | Facility: CLINIC | Age: 55
End: 2025-02-06
Payer: COMMERCIAL

## 2025-02-06 DIAGNOSIS — Z23 ENCOUNTER FOR IMMUNIZATION: Primary | ICD-10-CM

## 2025-02-06 PROCEDURE — 90471 IMMUNIZATION ADMIN: CPT

## 2025-02-06 PROCEDURE — 90677 PCV20 VACCINE IM: CPT

## 2025-02-11 DIAGNOSIS — D13.5 AMPULLARY ADENOMA: ICD-10-CM

## 2025-02-11 DIAGNOSIS — R07.81 RIB PAIN ON RIGHT SIDE: ICD-10-CM

## 2025-02-12 RX ORDER — PANTOPRAZOLE SODIUM 40 MG/1
40 TABLET, DELAYED RELEASE ORAL DAILY
Qty: 30 TABLET | Refills: 5 | Status: SHIPPED | OUTPATIENT
Start: 2025-02-12

## 2025-02-15 DIAGNOSIS — I10 ESSENTIAL HYPERTENSION: ICD-10-CM

## 2025-02-15 RX ORDER — AMLODIPINE BESYLATE 5 MG/1
5 TABLET ORAL DAILY
Qty: 90 TABLET | Refills: 0 | Status: SHIPPED | OUTPATIENT
Start: 2025-02-15

## 2025-06-03 ENCOUNTER — DOCUMENTATION (OUTPATIENT)
Dept: ADMINISTRATIVE | Facility: OTHER | Age: 55
End: 2025-06-03

## 2025-06-03 ENCOUNTER — OFFICE VISIT (OUTPATIENT)
Dept: URGENT CARE | Facility: MEDICAL CENTER | Age: 55
End: 2025-06-03
Payer: COMMERCIAL

## 2025-06-03 VITALS
DIASTOLIC BLOOD PRESSURE: 100 MMHG | HEART RATE: 98 BPM | BODY MASS INDEX: 28.35 KG/M2 | WEIGHT: 192 LBS | SYSTOLIC BLOOD PRESSURE: 138 MMHG | RESPIRATION RATE: 18 BRPM | TEMPERATURE: 98 F | OXYGEN SATURATION: 96 %

## 2025-06-03 DIAGNOSIS — S40.261A TICK BITE OF RIGHT SHOULDER, INITIAL ENCOUNTER: Primary | ICD-10-CM
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
## 2025-06-03 PROCEDURE — 99214 OFFICE O/P EST MOD 30 MIN: CPT | Performed by: PHYSICIAN ASSISTANT

## 2025-06-03 RX ORDER — DOXYCYCLINE 100 MG/1
100 TABLET ORAL 2 TIMES DAILY
Qty: 28 TABLET | Refills: 0 | Status: SHIPPED | OUTPATIENT
Start: 2025-06-03 | End: 2025-06-17

## 2025-06-03 NOTE — PATIENT INSTRUCTIONS
Tick bite right shoulder  Doxycycline twice daily  Follow up with PCP in 3-5 days.  Proceed to  ER if symptoms worsen.

## 2025-06-03 NOTE — PROGRESS NOTES
[facility]        NAME: Morgan Austin is a 55 y.o. male  : 1970    MRN: [id number]  DATE: Mirela 3, 2025  TIME: 10:09 AM    Assessment and Plan   Tick bite of right shoulder, initial encounter [S40.261A, W57.XXXA]  1. Tick bite of right shoulder, initial encounter              Patient Instructions     Tick bite right shoulder  Doxycycline twice daily  Follow up with PCP in 3-5 days.  Proceed to  ER if symptoms worsen.    Chief Complaint     Chief Complaint   Patient presents with    Rash     Pt. With a bullseye rash to an insect bit first notices 2 days ago. Fever, vomiting, body aches, diarrhea began yesterday.          History of Present Illness       55-year-old male who presents complaining of rash to the right shoulder after being bitten by a tick x 2 days.  Patient states that he has developed nausea, joint pain, diarrhea, body aches.  Patient is concerned about Lyme disease.  Denies fever, chills.    Rash  Pertinent negatives include no cough or shortness of breath.       Review of Systems   Review of Systems   Constitutional: Negative.    HENT: Negative.     Eyes: Negative.    Respiratory: Negative.  Negative for apnea, cough, choking, chest tightness, shortness of breath, wheezing and stridor.    Cardiovascular: Negative.  Negative for chest pain.   Skin:  Positive for rash.         Current Medications     Current Medications[1]    Current Allergies     Allergies as of 2025    (No Known Allergies)            The following portions of the patient's history were reviewed and updated as appropriate: allergies, current medications, past family history, past medical history, past social history, past surgical history and problem list.     Past Medical History[2]    Past Surgical History[3]    Family History[4]      Medications have been verified.        Objective   /100 Comment: Pt. states he has not taken his BP meds in a few days.  Pulse 98   Temp 98 °F (36.7 °C)   Resp 18    Wt 87.1 kg (192 lb)   SpO2 96%   BMI 28.35 kg/m²        Physical Exam     Physical Exam  Constitutional:       General: He is not in acute distress.     Appearance: Normal appearance. He is well-developed. He is not diaphoretic.   HENT:      Head: Normocephalic and atraumatic.     Cardiovascular:      Rate and Rhythm: Normal rate and regular rhythm.      Heart sounds: Normal heart sounds.   Pulmonary:      Effort: Pulmonary effort is normal. No respiratory distress.      Breath sounds: Normal breath sounds. No wheezing or rales.   Chest:      Chest wall: No tenderness.     Musculoskeletal:      Cervical back: Normal range of motion and neck supple.   Lymphadenopathy:      Cervical: No cervical adenopathy.     Skin:         Neurological:      Mental Status: He is alert.                        [1]   Current Outpatient Medications:     amLODIPine (NORVASC) 5 mg tablet, TAKE ONE TABLET BY MOUTH EVERY DAY, Disp: 90 tablet, Rfl: 0    Ascorbic Acid (VITAMIN C) 500 MG CAPS, Take 1 capsule by mouth in the morning., Disp: , Rfl:     hydrocortisone (WESTCORT) 0.2 % cream, , Disp: , Rfl:     multivitamin (THERAGRAN) TABS, Take 1 tablet by mouth in the morning., Disp: , Rfl:     pantoprazole (PROTONIX) 40 mg tablet, TAKE 1 TABLET BY MOUTH EVERY DAY, Disp: 30 tablet, Rfl: 5    dicyclomine (BENTYL) 10 mg capsule, Take 1 capsule (10 mg total) by mouth 3 (three) times a day as needed (abd pain) (Patient not taking: Reported on 6/3/2025), Disp: 90 capsule, Rfl: 1  [2]   Past Medical History:  Diagnosis Date    Colon polyp     Essential hypertension     Hypertension     [place] teeth extracted    [3]   Past Surgical History:  Procedure Laterality Date    CATARACT EXTRACTION Left     COLONOSCOPY      HAND SURGERY      KNEE SURGERY Left     UPPER GASTROINTESTINAL ENDOSCOPY     [4]   Family History  Problem Relation Name Age of Onset    No Known Problems Mother      No Known Problems Father      Cancer Maternal Grandmother      Colon  cancer Maternal Grandmother      Colon cancer Family      Diabetes Family          Mellitus

## 2025-06-03 NOTE — PROGRESS NOTES
Blood pressure elevated  Appointment department: [facility]  Appointment provider: * No providers found *  Blood pressure   06/03/25 0954 138/100   06/03/25 0951 144/100

## 2025-06-04 NOTE — PROGRESS NOTES
06/04/25 1:25 PM    Patient was called after the Urgent Care visit Patient declined to schedule appointment.    Patient has no symptoms dd not take his BP RX yesterday - if he does not feel well he will call the office    Thank you.  Karla Christianson MA  [facility]

## 2025-06-05 ENCOUNTER — OFFICE VISIT (OUTPATIENT)
Dept: INTERNAL MEDICINE CLINIC | Facility: CLINIC | Age: 55
End: 2025-06-05

## 2025-06-05 VITALS
WEIGHT: 193.8 LBS | DIASTOLIC BLOOD PRESSURE: 88 MMHG | BODY MASS INDEX: 28.71 KG/M2 | HEART RATE: 81 BPM | HEIGHT: 69 IN | SYSTOLIC BLOOD PRESSURE: 130 MMHG | OXYGEN SATURATION: 96 %
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
## 2025-06-05 DIAGNOSIS — S40.861S TICK BITE OF RIGHT UPPER ARM, SEQUELA: Primary | ICD-10-CM

## 2025-06-05 DIAGNOSIS — A69.20 ERYTHEMA MIGRANS (LYME DISEASE): ICD-10-CM

## 2025-06-05 RX ORDER — DIAPER,BRIEF,INFANT-TODD,DISP
EACH MISCELLANEOUS DAILY
COMMUNITY
Start: 2025-06-02

## 2025-06-05 RX ORDER — DOXYCYCLINE HYCLATE 100 MG
100 TABLET ORAL 2 TIMES DAILY
Qty: 14 TABLET | Refills: 0 | Status: SHIPPED | OUTPATIENT
Start: 2025-06-10 | End: 2025-06-17

## 2025-06-05 NOTE — ASSESSMENT & PLAN NOTE
Noted 4 days back when he experienced muscle pain, nausea vomiting, cold-like symptoms  Patient did not find the tick   Started on doxycycline 100 mg twice daily for 7 days on 6/3/2025  Symptoms have improved since starting doxycycline but continues to have headache but no photophobia, neck stiffness  Plan  Will increase the course of doxycycline 100 mg twice daily to 14 days  Lyme antibody with reflex to IgM/Ig G  Will check CBC and CMP

## 2025-06-05 NOTE — PROGRESS NOTES
Name: Morgan Austin      : 1970      MRN: [id number]  Encounter Provider: Dylon Dolan MD  Encounter Date: 2025   [facility]  :  Assessment & Plan  Tick bite of right upper arm, sequela    Orders:    CBC and differential; Future    Lyme Total AB W Reflex to IGM/IGG; Future    doxycycline hyclate (VIBRA-TABS) 100 mg tablet; Take 1 tablet (100 mg total) by mouth 2 (two) times a day for 7 days Do not start before Mirela 10, 2025.    Comprehensive metabolic panel; Future    Erythema migrans (Lyme disease)  Noted 4 days back when he experienced muscle pain, nausea vomiting, cold-like symptoms  Patient did not find the tick   Started on doxycycline 100 mg twice daily for 7 days on 6/3/2025  Symptoms have improved since starting doxycycline but continues to have headache but no photophobia, neck stiffness  Plan  Will increase the course of doxycycline 100 mg twice daily to 14 days  Lyme antibody with reflex to IgM/Ig G  Will check CBC and CMP                History of Present Illness   HPI  55-year-old male with history of prediabetes, hypertension, hypertriglyceridemia here for follow-up of tick bite.  Patient was seen at urgent care on 6/3/2025 with concerns of a bull's-eye rash from an insect-presumed to be a tick bite.  He was discharged home on doxycycline twice a day for 7 days.  He is here for follow-up.  Patient reports that symptoms started on , with headaches, cold, nausea and vomiting when he discovered the rash on the right upper extremity in the shoulder region-bull's-eye rash.  He was seen in the urgent care on 6/3/2025.  Reports that his symptoms have improved since starting the doxycycline but still continues to have headache.  Denies shortness of breath, dizziness, neck stiffness, photophobia.  Review of Systems   Constitutional:  Negative for chills and fever.   HENT:  Negative for sore throat.    Eyes:  Negative for visual  "disturbance.   Respiratory:  Negative for cough and shortness of breath.    Cardiovascular:  Negative for chest pain and palpitations.   Gastrointestinal:  Negative for abdominal pain, constipation, diarrhea, nausea and vomiting.   Genitourinary:  Negative for frequency, hematuria and urgency.   Neurological:  Negative for headaches.       Objective   /88 (BP Location: Left arm, Patient Position: Sitting, Cuff Size: Large)   Pulse 81   Ht 5' 9\" (1.753 m)   Wt 87.9 kg (193 lb 12.8 oz)   SpO2 96%   BMI 28.62 kg/m²      Physical Exam  Constitutional:       Appearance: Normal appearance.   HENT:      Mouth/Throat:      Mouth: Mucous membranes are moist.      Pharynx: Oropharynx is clear.     Eyes:      Extraocular Movements: Extraocular movements intact.      Pupils: Pupils are equal, round, and reactive to light.       Cardiovascular:      Rate and Rhythm: Normal rate and regular rhythm.   Pulmonary:      Effort: Pulmonary effort is normal.      Breath sounds: Normal breath sounds.   Abdominal:      General: Abdomen is flat.     Skin:     General: Skin is warm.      Comments: Erythema migrans on the right upper shoulder region     Neurological:      Mental Status: He is alert and oriented to person, place, and time. Mental status is at baseline.     Psychiatric:         Mood and Affect: Mood normal.         Behavior: Behavior normal.         "

## 2025-06-21 DIAGNOSIS — I10 ESSENTIAL HYPERTENSION: ICD-10-CM

## 2025-06-22 RX ORDER — AMLODIPINE BESYLATE 5 MG/1
5 TABLET ORAL DAILY
Qty: 90 TABLET | Refills: 0 | Status: SHIPPED | OUTPATIENT
Start: 2025-06-22

## 2025-06-23 ENCOUNTER — APPOINTMENT (OUTPATIENT)
Dept: LAB | Facility: MEDICAL CENTER | Age: 55
End: 2025-06-23
Payer: COMMERCIAL

## 2025-06-23 DIAGNOSIS — R73.03 PREDIABETES: ICD-10-CM
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
## 2025-06-23 DIAGNOSIS — S40.861S TICK BITE OF RIGHT UPPER ARM, SEQUELA: ICD-10-CM

## 2025-06-23 DIAGNOSIS — E78.1 HYPERTRIGLYCERIDEMIA: ICD-10-CM

## 2025-06-23 DIAGNOSIS — K76.0 FATTY LIVER: ICD-10-CM

## 2025-06-23 LAB
ALBUMIN SERPL BCG-MCNC: 4.4 G/DL (ref 3.5–5)
ALP SERPL-CCNC: 37 U/L (ref 34–104)
ALT SERPL W P-5'-P-CCNC: 57 U/L (ref 7–52)
ANION GAP SERPL CALCULATED.3IONS-SCNC: 7 MMOL/L (ref 4–13)
AST SERPL W P-5'-P-CCNC: 36 U/L (ref 13–39)
B BURGDOR IGG+IGM SER QL IA: NEGATIVE
BASOPHILS # BLD AUTO: 0.05 THOUSANDS/ÂΜL (ref 0–0.1)
BASOPHILS NFR BLD AUTO: 1 % (ref 0–1)
BILIRUB SERPL-MCNC: 1.21 MG/DL (ref 0.2–1)
BUN SERPL-MCNC: 11 MG/DL (ref 5–25)
CALCIUM SERPL-MCNC: 9.2 MG/DL (ref 8.4–10.2)
CHLORIDE SERPL-SCNC: 104 MMOL/L (ref 96–108)
CHOLEST SERPL-MCNC: 150 MG/DL (ref ?–200)
CO2 SERPL-SCNC: 28 MMOL/L (ref 21–32)
CREAT SERPL-MCNC: 0.9 MG/DL (ref 0.6–1.3)
EOSINOPHIL # BLD AUTO: 0.08 THOUSAND/ÂΜL (ref 0–0.61)
EOSINOPHIL NFR BLD AUTO: 1 % (ref 0–6)
ERYTHROCYTE [DISTWIDTH] IN BLOOD BY AUTOMATED COUNT: 12.3 % (ref 11.6–15.1)
EST. AVERAGE GLUCOSE BLD GHB EST-MCNC: 108 MG/DL
GFR SERPL CREATININE-BSD FRML MDRD: 95 ML/MIN/1.73SQ M
GLUCOSE P FAST SERPL-MCNC: 105 MG/DL (ref 65–99)
HBA1C MFR BLD: 5.4 %
HCT VFR BLD AUTO: 46.2 % (ref 36.5–49.3)
HDLC SERPL-MCNC: 46 MG/DL
HGB BLD-MCNC: 15.9 G/DL (ref 12–17)
IMM GRANULOCYTES # BLD AUTO: 0.05 THOUSAND/UL (ref 0–0.2)
IMM GRANULOCYTES NFR BLD AUTO: 1 % (ref 0–2)
LDLC SERPL CALC-MCNC: 75 MG/DL (ref 0–100)
LYMPHOCYTES # BLD AUTO: 2.1 THOUSANDS/ÂΜL (ref 0.6–4.47)
LYMPHOCYTES NFR BLD AUTO: 34 % (ref 14–44)
MCH RBC QN AUTO: 31.1 PG (ref 26.8–34.3)
MCHC RBC AUTO-ENTMCNC: 34.4 G/DL (ref 31.4–37.4)
MCV RBC AUTO: 90 FL (ref 82–98)
MONOCYTES # BLD AUTO: 0.53 THOUSAND/ÂΜL (ref 0.17–1.22)
MONOCYTES NFR BLD AUTO: 9 % (ref 4–12)
NEUTROPHILS # BLD AUTO: 3.3 THOUSANDS/ÂΜL (ref 1.85–7.62)
NEUTS SEG NFR BLD AUTO: 54 % (ref 43–75)
NRBC BLD AUTO-RTO: 0 /100 WBCS
PLATELET # BLD AUTO: 240 THOUSANDS/UL (ref 149–390)
PMV BLD AUTO: 10.3 FL (ref 8.9–12.7)
POTASSIUM SERPL-SCNC: 4.2 MMOL/L (ref 3.5–5.3)
PROT SERPL-MCNC: 7.3 G/DL (ref 6.4–8.4)
RBC # BLD AUTO: 5.11 MILLION/UL (ref 3.88–5.62)
SODIUM SERPL-SCNC: 139 MMOL/L (ref 135–147)
TRIGL SERPL-MCNC: 144 MG/DL (ref ?–150)
WBC # BLD AUTO: 6.11 THOUSAND/UL (ref 4.31–10.16)

## 2025-06-23 PROCEDURE — 36415 COLL VENOUS BLD VENIPUNCTURE: CPT

## 2025-06-23 PROCEDURE — 80061 LIPID PANEL: CPT

## 2025-06-23 PROCEDURE — 83036 HEMOGLOBIN GLYCOSYLATED A1C: CPT

## 2025-06-23 PROCEDURE — 80053 COMPREHEN METABOLIC PANEL: CPT

## 2025-06-23 PROCEDURE — 85025 COMPLETE CBC W/AUTO DIFF WBC: CPT

## 2025-06-23 PROCEDURE — 86618 LYME DISEASE ANTIBODY: CPT

## 2025-06-25 ENCOUNTER — OFFICE VISIT (OUTPATIENT)
Dept: GASTROENTEROLOGY | Facility: AMBULARY SURGERY CENTER | Age: 55
End: 2025-06-25
Payer: COMMERCIAL

## 2025-06-25 VITALS
HEART RATE: 82 BPM | BODY MASS INDEX: 28.53 KG/M2 | OXYGEN SATURATION: 96 % | HEIGHT: 69 IN | WEIGHT: 192.6 LBS | DIASTOLIC BLOOD PRESSURE: 88 MMHG | SYSTOLIC BLOOD PRESSURE: 144 MMHG

## 2025-06-25 DIAGNOSIS — R07.81 RIB PAIN ON RIGHT SIDE: ICD-10-CM

## 2025-06-25 DIAGNOSIS — K76.0 FATTY LIVER: Primary | ICD-10-CM

## 2025-06-25 DIAGNOSIS — D13.5 AMPULLARY ADENOMA: ICD-10-CM

## 2025-06-25 DIAGNOSIS — E88.01 ALPHA-1-ANTITRYPSIN DEFICIENCY (HCC): ICD-10-CM

## 2025-06-25 PROCEDURE — 99214 OFFICE O/P EST MOD 30 MIN: CPT | Performed by: INTERNAL MEDICINE

## 2025-06-25 RX ORDER — AMITRIPTYLINE HYDROCHLORIDE 10 MG/1
10 TABLET ORAL
Qty: 30 TABLET | Refills: 6 | Status: SHIPPED | OUTPATIENT
Start: 2025-06-25

## 2025-06-25 NOTE — PROGRESS NOTES
Name: Morgan Austin      : 1970      MRN: [id number]  Encounter Provider: Kareem Duffy MD  Encounter Date: 2025   Encounter department: [facility]  :  Tamie 55-year-old gentleman with a history of ampullary adenoma, hepatic steatosis, chronic right upper quadrant pain  Assessment & Plan  Fatty liver  -Most likely secondary to combination of alpha-1 antitrypsin deficiency, mild alcohol, hepatic steatosis  - Advised the patient dietary and lifestyle modification which she has been good with though recently gained some weight  - Will check repeat elastography at this time  - Most recent LFTs demonstrated mild elevation of ALT  Orders:    US elastography; Future    Ampullary adenoma s/p ampullectomy  -Last endoscopy was negative for recurrence of ampullary adenoma  - Repeat EGD next year       Alpha-1-antitrypsin deficiency (HCC)  -Advised the patient to minimize alcohol       Rib pain on right side  -Physical exam findings are most consistent with musculoskeletal pain  - Will try amitriptyline, discussed with him potential side effects including constipation, dry mouth, lightheadedness, dizziness, sleepiness  - Patient was advised to contact us via [employer] with an update  -  Orders:    amitriptyline (ELAVIL) 10 mg tablet; Take 1 tablet (10 mg total) by mouth daily at bedtime    Patient's prior pathology reports were reviewed.  Most recent laboratory studies were reviewed.  Last ultrasound was personally reviewed.    History of Present Illness   HPI  Morgan Austin is a 55 y.o. male who presents for follow-up.  This is a tamie 55-year-old gentleman initially presented with right upper quadrant pain, during that workup he was found to have an ampullary adenoma status post ampullectomy.  Also noted to have hepatic steatosis, initial elastography demonstrated cirrhosis, repeat elastography did not have any fibrosis.  Patient made some dietary lifestyle changes,  "lost some weight.  Still drinks about 3-6 drinks per week.  Most recent LFTs demonstrate mild elevation of ALT.  Otherwise patient is doing well.  Continues to have chronic right upper quadrant pain just under his right costal margin occasionally radiating to his contralateral side.    Denies any nausea, vomiting, heartburn, dysphagia, change in bowel moods, diarrhea, constipation, melena, rectal bleeding.      Review of Systems  Review of systems was negative except for pertinent positive mentioned in HPI         Objective   /88 (BP Location: Left arm, Patient Position: Sitting, Cuff Size: Standard)   Pulse 82   Ht 5' 9\" (1.753 m)   Wt 87.4 kg (192 lb 9.6 oz)   SpO2 96%   BMI 28.44 kg/m²      Physical Exam  GEN: WN/WD, no acute distress  HEENT: anicteric, MMM, no cervical lymphadenopathy  CV: RRR, no m/r/g  CHEST: CTA b/l, no WRR  ABD: +BS, soft NT/ND, no hepatosplenomegaly, reproducible tenderness just under right costal margin  EXT: no C/C/E  NEURO: AAOx3  SKIN: no rashes      "

## 2025-06-25 NOTE — ASSESSMENT & PLAN NOTE
-Physical exam findings are most consistent with musculoskeletal pain  - Will try amitriptyline, discussed with him potential side effects including constipation, dry mouth, lightheadedness, dizziness, sleepiness  - Patient was advised to contact us via [employer] with an update  -  Orders:    amitriptyline (ELAVIL) 10 mg tablet; Take 1 tablet (10 mg total) by mouth daily at bedtime

## 2025-06-25 NOTE — ASSESSMENT & PLAN NOTE
-Most likely secondary to combination of alpha-1 antitrypsin deficiency, mild alcohol, hepatic steatosis  - Advised the patient dietary and lifestyle modification which she has been good with though recently gained some weight  - Will check repeat elastography at this time  - Most recent LFTs demonstrated mild elevation of ALT  Orders:    US elastography; Future